# Patient Record
Sex: MALE | Race: WHITE | Employment: OTHER | URBAN - METROPOLITAN AREA
[De-identification: names, ages, dates, MRNs, and addresses within clinical notes are randomized per-mention and may not be internally consistent; named-entity substitution may affect disease eponyms.]

---

## 2017-01-30 PROCEDURE — 80061 LIPID PANEL: CPT | Performed by: INTERNAL MEDICINE

## 2017-01-30 PROCEDURE — 36415 COLL VENOUS BLD VENIPUNCTURE: CPT | Performed by: INTERNAL MEDICINE

## 2017-03-09 PROCEDURE — 36415 COLL VENOUS BLD VENIPUNCTURE: CPT | Performed by: INTERNAL MEDICINE

## 2017-03-09 PROCEDURE — 80048 BASIC METABOLIC PNL TOTAL CA: CPT | Performed by: INTERNAL MEDICINE

## 2017-12-11 PROBLEM — D12.6 COLON ADENOMA: Status: ACTIVE | Noted: 2017-12-11

## 2017-12-11 PROBLEM — J30.2 CHRONIC SEASONAL ALLERGIC RHINITIS: Status: ACTIVE | Noted: 2017-12-11

## 2017-12-11 PROBLEM — M54.30 BACK PAIN WITH SCIATICA: Status: ACTIVE | Noted: 2017-12-11

## 2017-12-11 PROBLEM — M54.9 BACK PAIN WITH SCIATICA: Status: ACTIVE | Noted: 2017-12-11

## 2019-02-20 PROBLEM — I50.32 CHRONIC DIASTOLIC CONGESTIVE HEART FAILURE (HCC): Status: ACTIVE | Noted: 2019-02-20

## 2019-02-20 PROCEDURE — 84153 ASSAY OF PSA TOTAL: CPT | Performed by: NURSE PRACTITIONER

## 2019-03-12 ENCOUNTER — OFFICE VISIT (OUTPATIENT)
Dept: INTERNAL MEDICINE CLINIC | Facility: CLINIC | Age: 69
End: 2019-03-12
Payer: MEDICARE

## 2019-03-12 VITALS
HEART RATE: 63 BPM | BODY MASS INDEX: 40.04 KG/M2 | HEIGHT: 71 IN | WEIGHT: 286 LBS | DIASTOLIC BLOOD PRESSURE: 78 MMHG | SYSTOLIC BLOOD PRESSURE: 120 MMHG | OXYGEN SATURATION: 98 % | TEMPERATURE: 98 F

## 2019-03-12 DIAGNOSIS — I42.9 PRIMARY CARDIOMYOPATHY (HCC): ICD-10-CM

## 2019-03-12 DIAGNOSIS — E78.5 HYPERLIPIDEMIA, UNSPECIFIED HYPERLIPIDEMIA TYPE: ICD-10-CM

## 2019-03-12 DIAGNOSIS — I50.32 CHRONIC DIASTOLIC CONGESTIVE HEART FAILURE (HCC): Primary | ICD-10-CM

## 2019-03-12 DIAGNOSIS — M54.9 BACK PAIN WITH SCIATICA: ICD-10-CM

## 2019-03-12 DIAGNOSIS — Z95.2 H/O AORTIC VALVE REPLACEMENT: ICD-10-CM

## 2019-03-12 DIAGNOSIS — D12.6 COLON ADENOMA: ICD-10-CM

## 2019-03-12 DIAGNOSIS — M54.30 BACK PAIN WITH SCIATICA: ICD-10-CM

## 2019-03-12 PROCEDURE — G0439 PPPS, SUBSEQ VISIT: HCPCS | Performed by: INTERNAL MEDICINE

## 2019-03-12 NOTE — PROGRESS NOTES
Adrián Esparza is a 70year old male who presents for a Medicare Annual Wellness visit. Overall has been feeling well. Compliant with all medications and tolerating all medications.   He does struggle with weight and has problems being strict with good No    Vision Problems? : (P) No    Difficulty walking?: (P) No    Difficulty dressing or bathing?: (P) No    Problems with daily activities? : (P) No    Memory Problems?: (P) No      Fall/Risk Assessment     Do you have 3 or more medical conditions?: (P) 0 every 10 years Colonoscopy due on 11/02/2020 Update [facility] if applicable    Flex Sigmoidoscopy Screen every 5 years No results found for this or any previous visit. No flowsheet data found.      Fecal Occult Blood Annually No results found for: previous visit. No flowsheet data found.       ALLERGIES:     Celecoxib               SWELLING  Enalapril                   Comment:Other reaction(s): ENALAPRIL  Ibuprofen                   Comment:Other reaction(s): IBUPROFEN  Naproxen Sodium             C Other         per ng lung & throat   • Cancer Brother    • Other (copd) Mother       SOCIAL HISTORY:   Social History    Tobacco Use      Smoking status: Former Smoker        Packs/day: 1.00        Years: 9.00        Pack years: 9        Types: Cigarettes Chart Acuity: 20/40 Left Eye Chart Acuity: 20/50   NECK: supple, no adenopathy, no bruits, no thyromegally  CHEST: no chest tenderness  LUNGS: clear to auscultation  CARDIO: RRR without murmur, S1,S2  GI: good BS's, no masses, HSM or tenderness  : , no h plan.  The patient is asked to return in 1 year for physical.    SUGGESTED VACCINATIONS - Influenza, Pneumococcal, Zoster, Tetanus     Immunization History   Administered Date(s) Administered   • FLU VACC High Dose 65 YRS & Older PRSV Free (82358) 12/11/20

## 2019-03-13 ENCOUNTER — PATIENT MESSAGE (OUTPATIENT)
Dept: INTERNAL MEDICINE CLINIC | Facility: CLINIC | Age: 69
End: 2019-03-13

## 2019-03-13 RX ORDER — TADALAFIL 10 MG/1
10 TABLET ORAL
Qty: 6 TABLET | Refills: 3 | Status: SHIPPED | OUTPATIENT
Start: 2019-03-13 | End: 2019-04-01

## 2019-03-13 NOTE — TELEPHONE ENCOUNTER
From: Rachid Dick  To: Azalea Workman MD  Sent: 3/13/2019 8:24 AM CDT  Subject: Non-Urgent Medical Question    WHEN I WAS IN ON TUESDAY I FORGOT TO ASK YOU IF YOU COULD PRESCRIBE A ED MEDICATION. AS I HAVE GOTTEN OLDER THIS HAS BECOME A PROBLEM.  WE D

## 2019-03-14 ENCOUNTER — TELEPHONE (OUTPATIENT)
Dept: INTERNAL MEDICINE CLINIC | Facility: CLINIC | Age: 69
End: 2019-03-14

## 2019-03-14 NOTE — TELEPHONE ENCOUNTER
Diego faxed Prior Auth for Tadalafil 10 mg tablets    Plan does not cover med  Call [phone]  Patient ID# [id number]  - fax in purple folder

## 2019-03-20 NOTE — TELEPHONE ENCOUNTER
[employer] faxed a PA Approval for Tadalafil  Approved from 03/18/90 through 03/18/2020  Placed in Red folder  Routed to Rx

## 2019-03-30 ENCOUNTER — PATIENT MESSAGE (OUTPATIENT)
Dept: INTERNAL MEDICINE CLINIC | Facility: CLINIC | Age: 69
End: 2019-03-30

## 2019-03-31 NOTE — TELEPHONE ENCOUNTER
From: Sherida Habermann Mulsoff  To:  Priscila Geller MD  Sent: 3/30/2019 12:02 PM CDT  Subject: Prescription Question    Since the Tadalafil 10 MG is so expensive through [employer] would it be Nicki Gil for you to confirm the Prescriptions through a [facility]

## 2019-04-01 NOTE — TELEPHONE ENCOUNTER
[facility] prescription request for Tadalafil 10mg   Fax# [phone] NE#[phone]

## 2019-04-01 NOTE — TELEPHONE ENCOUNTER
Please advise - patient wants to use pharmacy in [facility] and mailing to patient?  Med pending

## 2019-04-02 RX ORDER — TADALAFIL 10 MG/1
10 TABLET ORAL
Qty: 6 TABLET | Refills: 3 | OUTPATIENT
Start: 2019-04-02 | End: 2019-04-05

## 2019-04-03 RX ORDER — TADALAFIL 10 MG/1
10 TABLET ORAL
Qty: 6 TABLET | Refills: 3 | Status: CANCELLED | OUTPATIENT
Start: 2019-04-03

## 2019-04-04 ENCOUNTER — PATIENT MESSAGE (OUTPATIENT)
Dept: INTERNAL MEDICINE CLINIC | Facility: CLINIC | Age: 69
End: 2019-04-04

## 2019-04-04 PROBLEM — Z01.818 PREOPERATIVE EVALUATION TO RULE OUT SURGICAL CONTRAINDICATION: Status: ACTIVE | Noted: 2019-04-04

## 2019-04-04 NOTE — TELEPHONE ENCOUNTER
From: Freda Dick  To:  Glen Smith MD  Sent: 4/4/2019 2:10 PM CDT  Subject: Prescription Question    I am trying to get my Tadalafil 10 MG prescription through [facility] in [facility]) could you please send my prescription by Fax to them at [phone]

## 2019-04-05 RX ORDER — TADALAFIL 10 MG/1
10 TABLET ORAL
Qty: 6 TABLET | Refills: 3 | Status: SHIPPED | OUTPATIENT
Start: 2019-04-05 | End: 2019-04-05

## 2019-04-05 RX ORDER — TADALAFIL 10 MG/1
10 TABLET ORAL
Qty: 6 TABLET | Refills: 3 | Status: SHIPPED
Start: 2019-04-05 | End: 2020-08-03

## 2019-04-22 PROBLEM — I50.32 CHRONIC DIASTOLIC CONGESTIVE HEART FAILURE (HCC): Status: RESOLVED | Noted: 2019-02-20 | Resolved: 2019-04-22

## 2019-04-22 PROBLEM — I50.22 CHRONIC SYSTOLIC CONGESTIVE HEART FAILURE (HCC): Status: ACTIVE | Noted: 2019-04-22

## 2019-05-06 ENCOUNTER — MED REC SCAN ONLY (OUTPATIENT)
Dept: INTERNAL MEDICINE CLINIC | Facility: CLINIC | Age: 69
End: 2019-05-06

## 2020-01-13 ENCOUNTER — APPOINTMENT (OUTPATIENT)
Dept: GENERAL RADIOLOGY | Facility: HOSPITAL | Age: 70
End: 2020-01-13
Attending: INTERNAL MEDICINE
Payer: MEDICARE

## 2020-01-13 ENCOUNTER — HOSPITAL ENCOUNTER (OUTPATIENT)
Dept: INTERVENTIONAL RADIOLOGY/VASCULAR | Facility: HOSPITAL | Age: 70
Discharge: HOME | End: 2020-01-14
Attending: INTERNAL MEDICINE | Admitting: INTERNAL MEDICINE
Payer: MEDICARE

## 2020-01-13 DIAGNOSIS — I42.9 CARDIOMYOPATHY (HCC): ICD-10-CM

## 2020-01-13 LAB — MRSA DNA SPEC QL NAA+PROBE: NEGATIVE

## 2020-01-13 PROCEDURE — 93641 EP EVL 1/2CHMB PAC CVDFB TST: CPT

## 2020-01-13 PROCEDURE — 0JH608Z INSERTION OF DEFIBRILLATOR GENERATOR INTO CHEST SUBCUTANEOUS TISSUE AND FASCIA, OPEN APPROACH: ICD-10-PCS | Performed by: INTERNAL MEDICINE

## 2020-01-13 PROCEDURE — 36415 COLL VENOUS BLD VENIPUNCTURE: CPT

## 2020-01-13 PROCEDURE — 87641 MR-STAPH DNA AMP PROBE: CPT | Performed by: INTERNAL MEDICINE

## 2020-01-13 PROCEDURE — 4B02XTZ MEASUREMENT OF CARDIAC DEFIBRILLATOR, EXTERNAL APPROACH: ICD-10-PCS | Performed by: INTERNAL MEDICINE

## 2020-01-13 PROCEDURE — 02H63KZ INSERTION OF DEFIBRILLATOR LEAD INTO RIGHT ATRIUM, PERCUTANEOUS APPROACH: ICD-10-PCS | Performed by: INTERNAL MEDICINE

## 2020-01-13 PROCEDURE — 99153 MOD SED SAME PHYS/QHP EA: CPT

## 2020-01-13 PROCEDURE — 71045 X-RAY EXAM CHEST 1 VIEW: CPT | Performed by: INTERNAL MEDICINE

## 2020-01-13 PROCEDURE — 99152 MOD SED SAME PHYS/QHP 5/>YRS: CPT

## 2020-01-13 PROCEDURE — 02HK3KZ INSERTION OF DEFIBRILLATOR LEAD INTO RIGHT VENTRICLE, PERCUTANEOUS APPROACH: ICD-10-PCS | Performed by: INTERNAL MEDICINE

## 2020-01-13 PROCEDURE — 33249 INSJ/RPLCMT DEFIB W/LEAD(S): CPT

## 2020-01-13 RX ORDER — CEFAZOLIN SODIUM/WATER 2 G/20 ML
SYRINGE (ML) INTRAVENOUS
Status: DISCONTINUED
Start: 2020-01-13 | End: 2020-01-13 | Stop reason: WASHOUT

## 2020-01-13 RX ORDER — ATORVASTATIN CALCIUM 20 MG/1
20 TABLET, FILM COATED ORAL NIGHTLY
Status: DISCONTINUED | OUTPATIENT
Start: 2020-01-13 | End: 2020-01-14

## 2020-01-13 RX ORDER — VANCOMYCIN HYDROCHLORIDE
15 ONCE
Status: DISCONTINUED | OUTPATIENT
Start: 2020-01-13 | End: 2020-01-14

## 2020-01-13 RX ORDER — CARVEDILOL 12.5 MG/1
25 TABLET ORAL 2 TIMES DAILY WITH MEALS
Status: DISCONTINUED | OUTPATIENT
Start: 2020-01-13 | End: 2020-01-14

## 2020-01-13 RX ORDER — ACETAMINOPHEN AND CODEINE PHOSPHATE 300; 30 MG/1; MG/1
TABLET ORAL
Status: COMPLETED
Start: 2020-01-13 | End: 2020-01-13

## 2020-01-13 RX ORDER — ONDANSETRON 2 MG/ML
4 INJECTION INTRAMUSCULAR; INTRAVENOUS EVERY 6 HOURS PRN
Status: DISCONTINUED | OUTPATIENT
Start: 2020-01-13 | End: 2020-01-14

## 2020-01-13 RX ORDER — VANCOMYCIN HYDROCHLORIDE
15 ONCE
Status: COMPLETED | OUTPATIENT
Start: 2020-01-14 | End: 2020-01-14

## 2020-01-13 RX ORDER — SODIUM CHLORIDE 9 MG/ML
INJECTION, SOLUTION INTRAVENOUS
Status: ACTIVE | OUTPATIENT
Start: 2020-01-13 | End: 2020-01-13

## 2020-01-13 RX ORDER — ACETAMINOPHEN AND CODEINE PHOSPHATE 300; 30 MG/1; MG/1
1 TABLET ORAL EVERY 4 HOURS PRN
Status: DISCONTINUED | OUTPATIENT
Start: 2020-01-13 | End: 2020-01-14

## 2020-01-13 RX ORDER — ASPIRIN 81 MG/1
81 TABLET ORAL DAILY
Status: DISCONTINUED | OUTPATIENT
Start: 2020-01-14 | End: 2020-01-14

## 2020-01-13 RX ORDER — MIDAZOLAM HYDROCHLORIDE 1 MG/ML
INJECTION INTRAMUSCULAR; INTRAVENOUS
Status: COMPLETED
Start: 2020-01-13 | End: 2020-01-13

## 2020-01-13 RX ORDER — BACITRACIN 50000 [USP'U]/1
INJECTION, POWDER, LYOPHILIZED, FOR SOLUTION INTRAMUSCULAR
Status: COMPLETED
Start: 2020-01-13 | End: 2020-01-13

## 2020-01-13 RX ORDER — LIDOCAINE HYDROCHLORIDE AND EPINEPHRINE 10; 10 MG/ML; UG/ML
INJECTION, SOLUTION INFILTRATION; PERINEURAL
Status: COMPLETED
Start: 2020-01-13 | End: 2020-01-13

## 2020-01-13 RX ADMIN — ACETAMINOPHEN AND CODEINE PHOSPHATE 1 TABLET: 300; 30 TABLET ORAL at 22:24:00

## 2020-01-13 RX ADMIN — ACETAMINOPHEN AND CODEINE PHOSPHATE 1 TABLET: 300; 30 TABLET ORAL at 16:00:00

## 2020-01-13 RX ADMIN — CARVEDILOL 25 MG: 12.5 TABLET ORAL at 17:31:00

## 2020-01-13 NOTE — PROGRESS NOTES
Procedure hand off report called to [facility].  Vitals signs stable procedure site remains intact without signs of hematoma or bleeding. Dr. Lupis Rutledge spoke with patient / family post procedure.    Transferred to floor   Hand off at bedside Update to

## 2020-01-13 NOTE — PLAN OF CARE
Patient received as an admission from cath lab post biventricular ICD insertion by Dr Carl Mcdonough. Pt doing well. Surgical incision site dressing CDI. Denies any discomfort at this time. VS stable. L arm sling in place. AV paced on telemetry.  Pt and wife oriented of decreased coronary artery perfusion - ex.  Angina  - Evaluate fluid balance, assess for edema, trend weights  Outcome: Progressing  Goal: Absence of cardiac arrhythmias or at baseline  Description  INTERVENTIONS:  - Continuous cardiac monitoring, monitor

## 2020-01-13 NOTE — PROGRESS NOTES
Post procedure. Left chest incision site intact without any hematoma or active bleeding. Pain noted pressure pain at surgical site. Dr. Kelly Patel at bedside order recd. Tylenol with codeine given. Will continue to monitor.    Stat Portable chest xray do

## 2020-01-13 NOTE — INTERVAL H&P NOTE
Pre-op Diagnosis: * No pre-op diagnosis entered *    The above referenced H&P was reviewed by Navdeep Muhammad MD on 1/13/2020, the patient was examined and no significant changes have occurred in the patient's condition since the H&P was performed.   I discus

## 2020-01-14 ENCOUNTER — APPOINTMENT (OUTPATIENT)
Dept: GENERAL RADIOLOGY | Facility: HOSPITAL | Age: 70
End: 2020-01-14
Attending: INTERNAL MEDICINE
Payer: MEDICARE

## 2020-01-14 VITALS
WEIGHT: 296 LBS | HEART RATE: 67 BPM | SYSTOLIC BLOOD PRESSURE: 99 MMHG | HEIGHT: 71 IN | DIASTOLIC BLOOD PRESSURE: 54 MMHG | TEMPERATURE: 98 F | RESPIRATION RATE: 16 BRPM | OXYGEN SATURATION: 92 % | BODY MASS INDEX: 41.44 KG/M2

## 2020-01-14 PROCEDURE — 71046 X-RAY EXAM CHEST 2 VIEWS: CPT | Performed by: INTERNAL MEDICINE

## 2020-01-14 RX ADMIN — VANCOMYCIN HYDROCHLORIDE 1500 MG: at 02:41:00

## 2020-01-14 RX ADMIN — CARVEDILOL 25 MG: 12.5 TABLET ORAL at 09:19:00

## 2020-01-14 RX ADMIN — ACETAMINOPHEN AND CODEINE PHOSPHATE 1 TABLET: 300; 30 TABLET ORAL at 04:33:00

## 2020-01-14 RX ADMIN — ASPIRIN 81 MG: 81 TABLET ORAL at 09:18:00

## 2020-01-14 NOTE — PROCEDURES
[facility]    PATIENT'S NAME: Rhea Rich   ATTENDING PHYSICIAN: Alberto Mart. Boom Choi MD   OPERATING PHYSICIAN: Alberto Mart.  Boom Choi MD   PATIENT ACCOUNT#:   [id number]    LOCATION:  [address]  MEDICAL RECORD #:   [id number]       FRANKLIN seen were intact. One was a [id number], serial [id number]. This was tested and functioning well. It had a separation of 130. The other one would also be tested; [id number], serial #[id number].   This had a pacing threshold of 2.6 V, separation of 120, and pa subclavian vein, freeing up of old epicardial lead, testing both epicardial leads, choosing one. DFTs less than or equal to 30 joules. Dictated By Karely Rowe.  Jethro San MD  d: 01/13/2020 15:41:02  t: 01/13/2020 16:05:03  Job [id number]  [facility]/

## 2020-01-14 NOTE — PLAN OF CARE
A/O x4. RA. S/p biventricular ICD insertion by Dr. Aletha Schlatter. Hx cardiomyopathy, pacemaker (a-v paced). IV abx (vancomycin). Tylenol #3 given for px management at 2224. Plan: CXR PA lateral 1/14. Home w/ wife upon d/c.     Problem: Patient Centered Care  Goal: P weights  Outcome: Progressing  Goal: Absence of cardiac arrhythmias or at baseline  Description  INTERVENTIONS:  - Continuous cardiac monitoring, monitor vital signs, obtain 12 lead EKG if indicated  - Evaluate effectiveness of antiarrhythmic and heart rat

## 2020-01-14 NOTE — PROGRESS NOTES
ASSESSMENT/PLAN:     IMP:  1. Cardiomyopathy without chf  2. S/p avr for ai  3. icd post implant day 1    rec ok to dc  Reviewed meds, activity, follow up with them  ?  answered          ------------------------------------------------------------------- SWELLING  Enalapril                   Comment:Other reaction(s): ENALAPRIL  Ibuprofen                   Comment:Other reaction(s): IBUPROFEN  Naproxen Sodium             Comment:Other reaction(s): NAPROXEN SODIUM  Penicillins                 Comment:

## 2020-01-14 NOTE — PROGRESS NOTES
Outpatient Surgery Brief Discharge Summary         Patient ID:  Real Wilson  [id number]  78year old  6/17/1950    Discharge Diagnoses: cardiomyopathy  Procedures: icd  Discharged Condition: stable    Disposition: home    Patient Instructions:  Follow-up

## 2020-01-22 PROBLEM — Z95.810 AICD (AUTOMATIC CARDIOVERTER/DEFIBRILLATOR) PRESENT: Status: ACTIVE | Noted: 2020-01-22

## 2020-07-08 PROBLEM — I48.3 TYPICAL ATRIAL FLUTTER (HCC): Status: ACTIVE | Noted: 2020-07-08

## 2020-08-04 ENCOUNTER — LAB ENCOUNTER (OUTPATIENT)
Dept: LAB | Facility: HOSPITAL | Age: 70
End: 2020-08-04
Attending: INTERNAL MEDICINE
Payer: MEDICARE

## 2020-08-04 DIAGNOSIS — Z01.818 PREOP TESTING: ICD-10-CM

## 2020-08-04 LAB — SARS-COV-2 RNA RESP QL NAA+PROBE: NOT DETECTED

## 2020-08-05 ENCOUNTER — HOSPITAL ENCOUNTER (OUTPATIENT)
Dept: INTERVENTIONAL RADIOLOGY/VASCULAR | Facility: HOSPITAL | Age: 70
Discharge: HOME | End: 2020-08-05
Attending: INTERNAL MEDICINE | Admitting: INTERNAL MEDICINE
Payer: MEDICARE

## 2020-08-05 VITALS
DIASTOLIC BLOOD PRESSURE: 66 MMHG | OXYGEN SATURATION: 97 % | BODY MASS INDEX: 41 KG/M2 | HEART RATE: 61 BPM | SYSTOLIC BLOOD PRESSURE: 108 MMHG | RESPIRATION RATE: 18 BRPM | WEIGHT: 291 LBS

## 2020-08-05 DIAGNOSIS — I48.92 ATRIAL FLUTTER (HCC): ICD-10-CM

## 2020-08-05 DIAGNOSIS — Z01.818 PREOP TESTING: Primary | ICD-10-CM

## 2020-08-05 LAB — POTASSIUM SERPL-SCNC: 4 MMOL/L (ref 3.5–5.1)

## 2020-08-05 PROCEDURE — 5A2204Z RESTORATION OF CARDIAC RHYTHM, SINGLE: ICD-10-PCS | Performed by: INTERNAL MEDICINE

## 2020-08-05 PROCEDURE — 93010 ELECTROCARDIOGRAM REPORT: CPT | Performed by: INTERNAL MEDICINE

## 2020-08-05 PROCEDURE — 36415 COLL VENOUS BLD VENIPUNCTURE: CPT

## 2020-08-05 PROCEDURE — 92960 CARDIOVERSION ELECTRIC EXT: CPT

## 2020-08-05 PROCEDURE — 93005 ELECTROCARDIOGRAM TRACING: CPT

## 2020-08-05 PROCEDURE — 84132 ASSAY OF SERUM POTASSIUM: CPT | Performed by: INTERNAL MEDICINE

## 2020-08-05 RX ORDER — SODIUM CHLORIDE 9 MG/ML
INJECTION, SOLUTION INTRAVENOUS CONTINUOUS
Status: DISCONTINUED | OUTPATIENT
Start: 2020-08-05 | End: 2020-08-05

## 2020-08-05 NOTE — INTERVAL H&P NOTE
Pre-op Diagnosis: * No pre-op diagnosis entered *    The above referenced H&P was reviewed by Lizz Milton MD on 8/5/2020, the patient was examined and no significant changes have occurred in the patient's condition since the H&P was performed.   I d

## 2020-08-05 NOTE — IVS NOTE
Pt was able to eat and drink, no nausea or vomiting. EKG done as ordered. Discharge instructions was given.      Pt was comfortable on discharge

## 2020-08-05 NOTE — PROCEDURES
[facility]    Cardioversion Procedure Note    Sandra Fee [facility] Patient Status:  Outpatient in a Bed    1950 MRN [id number]   Location [facility] Attending Jyoti Nelson MD   Hosp Day # 0 PCP Delma Lee

## 2020-08-05 NOTE — PROGRESS NOTES
Outpatient Surgery Brief Discharge Summary         Patient ID:  Raciel Ness  [id number]  69year old  6/17/1950    Discharge Diagnoses: atrila fib  Procedures: dc cardioversion    Discharged Condition: stable    Disposition: home    Patient Instructions

## 2020-09-29 ENCOUNTER — LAB ENCOUNTER (OUTPATIENT)
Dept: LAB | Age: 70
End: 2020-09-29
Attending: INTERNAL MEDICINE
Payer: MEDICARE

## 2020-09-29 ENCOUNTER — OFFICE VISIT (OUTPATIENT)
Dept: INTERNAL MEDICINE CLINIC | Facility: CLINIC | Age: 70
End: 2020-09-29
Payer: MEDICARE

## 2020-09-29 VITALS
HEART RATE: 75 BPM | BODY MASS INDEX: 42 KG/M2 | SYSTOLIC BLOOD PRESSURE: 124 MMHG | TEMPERATURE: 97 F | WEIGHT: 298 LBS | OXYGEN SATURATION: 98 % | RESPIRATION RATE: 14 BRPM | DIASTOLIC BLOOD PRESSURE: 80 MMHG

## 2020-09-29 DIAGNOSIS — M54.30 BACK PAIN WITH SCIATICA: ICD-10-CM

## 2020-09-29 DIAGNOSIS — D12.6 COLON ADENOMA: ICD-10-CM

## 2020-09-29 DIAGNOSIS — Z12.5 ENCOUNTER FOR SCREENING FOR MALIGNANT NEOPLASM OF PROSTATE: ICD-10-CM

## 2020-09-29 DIAGNOSIS — M54.9 BACK PAIN WITH SCIATICA: ICD-10-CM

## 2020-09-29 DIAGNOSIS — Z00.00 PHYSICAL EXAM: ICD-10-CM

## 2020-09-29 DIAGNOSIS — J30.2 CHRONIC SEASONAL ALLERGIC RHINITIS: ICD-10-CM

## 2020-09-29 DIAGNOSIS — Z95.2 H/O AORTIC VALVE REPLACEMENT: ICD-10-CM

## 2020-09-29 DIAGNOSIS — I48.3 TYPICAL ATRIAL FLUTTER (HCC): ICD-10-CM

## 2020-09-29 DIAGNOSIS — Z00.00 PHYSICAL EXAM: Primary | ICD-10-CM

## 2020-09-29 DIAGNOSIS — I50.22 CHRONIC SYSTOLIC CONGESTIVE HEART FAILURE (HCC): ICD-10-CM

## 2020-09-29 DIAGNOSIS — E66.01 MORBID OBESITY (HCC): ICD-10-CM

## 2020-09-29 LAB — TSI SER-ACNC: 2.06 MIU/ML (ref 0.36–3.74)

## 2020-09-29 PROCEDURE — 85025 COMPLETE CBC W/AUTO DIFF WBC: CPT

## 2020-09-29 PROCEDURE — 80061 LIPID PANEL: CPT

## 2020-09-29 PROCEDURE — 80053 COMPREHEN METABOLIC PANEL: CPT

## 2020-09-29 PROCEDURE — G0463 HOSPITAL OUTPT CLINIC VISIT: HCPCS | Performed by: INTERNAL MEDICINE

## 2020-09-29 PROCEDURE — 90662 IIV NO PRSV INCREASED AG IM: CPT | Performed by: INTERNAL MEDICINE

## 2020-09-29 PROCEDURE — G0439 PPPS, SUBSEQ VISIT: HCPCS | Performed by: INTERNAL MEDICINE

## 2020-09-29 PROCEDURE — 84443 ASSAY THYROID STIM HORMONE: CPT | Performed by: INTERNAL MEDICINE

## 2020-09-29 PROCEDURE — 36415 COLL VENOUS BLD VENIPUNCTURE: CPT | Performed by: INTERNAL MEDICINE

## 2020-09-29 PROCEDURE — G0008 ADMIN INFLUENZA VIRUS VAC: HCPCS | Performed by: INTERNAL MEDICINE

## 2020-09-29 PROCEDURE — 99213 OFFICE O/P EST LOW 20 MIN: CPT | Performed by: INTERNAL MEDICINE

## 2020-09-29 PROCEDURE — 83036 HEMOGLOBIN GLYCOSYLATED A1C: CPT

## 2020-09-29 NOTE — PROGRESS NOTES
Torrie Jennings is a 69year old male who presents for a Medicare Annual Wellness visit. Overall, patient has been feeling well. He did develop recurrent atrial flutter, saw cardiologist and required cardioversion.   Since then he has been remaining in help    Toileting: Able without help    Dressing: Able without help    Eating: Able without help    Driving: Able without help    Preparing your meals: Able without help    Managing money/bills: Able without help    Taking medications as prescribed: Able w 5.4    No flowsheet data found.        Fasting Blood Sugar (FSB)Annually Glucose (mg/dL)   Date Value   08/03/2020 115 (H)          Cardiovascular Disease Screening     LDL Annually Calculated LDL (mg/dL)   Date Value   12/30/2019 111        EKG One Time No Date Value   02/20/2019 5.4    No flowsheet data found. Creat/alb ratio  Annually      LDL  Annually Calculated LDL (mg/dL)   Date Value   12/30/2019 111    No flowsheet data found. Dilated Eye exam  Annually No flowsheet data found.   No flowsheet • DJD (degenerative joint disease)    • Heart valve disease    • High blood pressure    • High cholesterol    • Obesity, unspecified    • Osteoarthritis    • Other and unspecified hyperlipidemia    • Seasonal allergies    • Unspecified essential hyperten Temp 97.2 °F (36.2 °C) (Temporal)   Resp 14   Wt 298 lb (135.2 kg)   SpO2 98%   BMI 41.56 kg/m²      > Wt Readings from Last 6 Encounters:  09/29/20 : 298 lb (135.2 kg)  08/12/20 : 290 lb (131.5 kg)  08/04/20 : 291 lb (132 kg)  08/03/20 : 291 lb (132 kg) obesity (HCC)    Chronic systolic congestive heart failure (HCC)    Typical atrial flutter (HCC)    Chronic seasonal allergic rhinitis    Back pain with sciatica    H/O aortic valve replacement    Colon adenoma  -     GASTRO - INTERNAL    Encounter for scr control with use of Allegra as needed. Patient has history of colon adenoma and will be due for follow-up colonoscopy in November of this year--I have given him referral to follow-up with gastroenterology. We will check screening PSA today.     Taya

## 2020-10-02 ENCOUNTER — TELEPHONE (OUTPATIENT)
Dept: GASTROENTEROLOGY | Facility: CLINIC | Age: 70
End: 2020-10-02

## 2020-10-02 ENCOUNTER — TELEPHONE (OUTPATIENT)
Dept: INTERNAL MEDICINE CLINIC | Facility: CLINIC | Age: 70
End: 2020-10-02

## 2020-10-02 NOTE — TELEPHONE ENCOUNTER
----- Message from Karoline Beal RN sent at 11/3/2015  8:35 AM CST -----  Regarding: colon recall  Repeat colon in 5 years per Dr Reyes Nunn

## 2020-10-26 ENCOUNTER — TELEPHONE (OUTPATIENT)
Dept: GASTROENTEROLOGY | Facility: CLINIC | Age: 70
End: 2020-10-26

## 2020-10-26 NOTE — TELEPHONE ENCOUNTER
Patient:   Terrell Santana #:   [id number]                    Room: [facility]  Zoë CALERO #:  [id number]     ADMITTED:   11/02/2015        DATE OF PROCEDURE:  11/02/2015     PREPROCEDURE DIAGNOSIS:  Prior history of colon polyps.   Last   colonoscopy  examinat A  ATTENDING:  Anne-Marie Sosa, [address]  DICTATING:    Sonal Johnson. Aliza Sosa MD [id number]  MD ID #:      [id number]  cc:   Orville Duke  E  Apryl Sosa, [address]        JOB NUMBER:         [id number]  [facility] #: [id number].[facility]  MR #: [id number] Felton Flores MD                11/02/15 (Electronic)   --------------------------------------------------------------------------------                                                   Lory Arcos M.D., [facility] LICENSE#: [id number]

## 2020-10-26 NOTE — TELEPHONE ENCOUNTER
Left message for patient to call back to review below screening questions.     Last Procedure, Date, MD:  Dr. Navdeep Hilario Colonoscopy 11/02/2015  Last Diagnosis:  Colon polyp x1, diverticulosis, internal hemorrhoids  Recalled for (mth/yrs): 5 years  Sedation used

## 2020-10-27 NOTE — TELEPHONE ENCOUNTER
Patient has appointment with cardiologist in same building on 11/17 so he accepted appointment to follow with Megan Jennings at 2:30pm.  Patient advised to notify our office if he is running late at prior appointment.     Future Appointments   Date Time Provider Sandra Post

## 2020-10-27 NOTE — TELEPHONE ENCOUNTER
Ivania Ramires    Patient does not want to come to office-I advised office appointment. Had pacemaker placed one year ago. I reviewed screening/all medications below.       Last Procedure, Date, MD:  Dr. Anna Marie Navarro Colonoscopy 11/02/2015  Last Diagnosis:  Colon polyp

## 2020-10-27 NOTE — TELEPHONE ENCOUNTER
Nursing: I would agree w/ OV.  If there is any new cardiac hx, this needs to be assessed in office prior to scheduling

## 2020-11-16 ENCOUNTER — PATIENT MESSAGE (OUTPATIENT)
Dept: GASTROENTEROLOGY | Facility: CLINIC | Age: 70
End: 2020-11-16

## 2020-11-16 NOTE — TELEPHONE ENCOUNTER
From: Meghan Dodson [facility]  To: GRISELDA Trevino  Sent: 11/16/2020 7:20 AM CST  Subject: Non-Urgent Medical Question    Please cancel my appointment for November 17th.  Because of the increase in Covid cases I will not be having the procedure till after I am

## 2020-11-16 NOTE — TELEPHONE ENCOUNTER
AL Ngay    Patient due for 5 year recall colonoscopy. Cancelled screening appointment due to COVID concerns.     Thank you

## 2020-12-15 ENCOUNTER — APPOINTMENT (OUTPATIENT)
Dept: GENERAL RADIOLOGY | Facility: HOSPITAL | Age: 70
DRG: 287 | End: 2020-12-15
Attending: EMERGENCY MEDICINE
Payer: MEDICARE

## 2020-12-15 ENCOUNTER — HOSPITAL ENCOUNTER (INPATIENT)
Facility: HOSPITAL | Age: 70
LOS: 3 days | Discharge: ANOTHER HOSPITAL | DRG: 287 | End: 2020-12-18
Attending: EMERGENCY MEDICINE | Admitting: INTERNAL MEDICINE
Payer: MEDICARE

## 2020-12-15 ENCOUNTER — APPOINTMENT (OUTPATIENT)
Dept: INTERVENTIONAL RADIOLOGY/VASCULAR | Facility: HOSPITAL | Age: 70
DRG: 287 | End: 2020-12-15
Attending: INTERNAL MEDICINE
Payer: MEDICARE

## 2020-12-15 DIAGNOSIS — I47.2 VENTRICULAR TACHYCARDIA (HCC): ICD-10-CM

## 2020-12-15 DIAGNOSIS — R55 SYNCOPE AND COLLAPSE: Primary | ICD-10-CM

## 2020-12-15 DIAGNOSIS — Z45.02 DEFIBRILLATOR DISCHARGE: ICD-10-CM

## 2020-12-15 PROBLEM — I47.20 VENTRICULAR TACHYCARDIA (HCC): Status: ACTIVE | Noted: 2020-12-15

## 2020-12-15 PROBLEM — I47.20 VENTRICULAR TACHYCARDIA: Status: ACTIVE | Noted: 2020-12-15

## 2020-12-15 PROCEDURE — B2111ZZ FLUOROSCOPY OF MULTIPLE CORONARY ARTERIES USING LOW OSMOLAR CONTRAST: ICD-10-PCS | Performed by: INTERNAL MEDICINE

## 2020-12-15 PROCEDURE — 71045 X-RAY EXAM CHEST 1 VIEW: CPT | Performed by: EMERGENCY MEDICINE

## 2020-12-15 RX ORDER — VERAPAMIL HYDROCHLORIDE 2.5 MG/ML
INJECTION, SOLUTION INTRAVENOUS
Status: COMPLETED
Start: 2020-12-15 | End: 2020-12-15

## 2020-12-15 RX ORDER — ONDANSETRON 2 MG/ML
4 INJECTION INTRAMUSCULAR; INTRAVENOUS EVERY 6 HOURS PRN
Status: DISCONTINUED | OUTPATIENT
Start: 2020-12-15 | End: 2020-12-18

## 2020-12-15 RX ORDER — SOTALOL HYDROCHLORIDE 80 MG/1
160 TABLET ORAL EVERY 12 HOURS SCHEDULED
Status: DISCONTINUED | OUTPATIENT
Start: 2020-12-15 | End: 2020-12-18

## 2020-12-15 RX ORDER — LIDOCAINE HYDROCHLORIDE 20 MG/ML
INJECTION, SOLUTION EPIDURAL; INFILTRATION; INTRACAUDAL; PERINEURAL
Status: COMPLETED
Start: 2020-12-15 | End: 2020-12-15

## 2020-12-15 RX ORDER — MIDAZOLAM HYDROCHLORIDE 1 MG/ML
INJECTION INTRAMUSCULAR; INTRAVENOUS
Status: COMPLETED
Start: 2020-12-15 | End: 2020-12-15

## 2020-12-15 RX ORDER — HEPARIN SODIUM 1000 [USP'U]/ML
INJECTION, SOLUTION INTRAVENOUS; SUBCUTANEOUS
Status: COMPLETED
Start: 2020-12-15 | End: 2020-12-15

## 2020-12-15 RX ORDER — NITROGLYCERIN 20 MG/100ML
INJECTION INTRAVENOUS
Status: DISCONTINUED
Start: 2020-12-15 | End: 2020-12-15 | Stop reason: WASHOUT

## 2020-12-15 RX ORDER — ZOLPIDEM TARTRATE 5 MG/1
5 TABLET ORAL NIGHTLY PRN
Status: DISCONTINUED | OUTPATIENT
Start: 2020-12-15 | End: 2020-12-18

## 2020-12-15 RX ORDER — ATORVASTATIN CALCIUM 20 MG/1
20 TABLET, FILM COATED ORAL NIGHTLY
Status: DISCONTINUED | OUTPATIENT
Start: 2020-12-15 | End: 2020-12-18

## 2020-12-15 RX ORDER — CETIRIZINE HYDROCHLORIDE 10 MG/1
10 TABLET ORAL DAILY
Status: DISCONTINUED | OUTPATIENT
Start: 2020-12-15 | End: 2020-12-18

## 2020-12-15 RX ORDER — ACETAMINOPHEN 325 MG/1
650 TABLET ORAL EVERY 6 HOURS PRN
Status: DISCONTINUED | OUTPATIENT
Start: 2020-12-15 | End: 2020-12-18

## 2020-12-15 RX ORDER — ASPIRIN 81 MG/1
81 TABLET ORAL DAILY
Status: DISCONTINUED | OUTPATIENT
Start: 2020-12-15 | End: 2020-12-18

## 2020-12-15 RX ORDER — CARVEDILOL 25 MG/1
25 TABLET ORAL 2 TIMES DAILY WITH MEALS
Status: DISCONTINUED | OUTPATIENT
Start: 2020-12-15 | End: 2020-12-18

## 2020-12-15 RX ORDER — SOTALOL HYDROCHLORIDE 80 MG/1
120 TABLET ORAL 2 TIMES DAILY
Status: DISCONTINUED | OUTPATIENT
Start: 2020-12-15 | End: 2020-12-15

## 2020-12-15 RX ORDER — METOCLOPRAMIDE HYDROCHLORIDE 5 MG/ML
10 INJECTION INTRAMUSCULAR; INTRAVENOUS EVERY 8 HOURS PRN
Status: DISCONTINUED | OUTPATIENT
Start: 2020-12-15 | End: 2020-12-18

## 2020-12-15 NOTE — PROGRESS NOTES
ASSESSMENT/PLAN:     .  Ventricular tachycardia post defibrillator shock and is 76-year-old male with a known cardiomyopathy. 2.  History of aortic regurgitation post aortic valve replacement. 3.  History of a cardiomyopathy.   His last echo shows an E Sacubitril-Valsartan (ENTRESTO) 49-51 MG Oral Tab Take 1 tablet by mouth 2 (two) times daily. 180 tablet 3   • ASPIRIN EC LOW DOSE 81 MG Oral Tab EC Take 81 mg by mouth daily. • Omega-3 Fatty Acids (FISH OIL) 1200 MG Oral Cap Take  by mouth.  take 1 c deficits. HEME/LYMPH:denies easy bruising. ALL:no new allergies. ROS otherwise negative except as in HPI.   EXAM:   /73   Pulse 65   Temp 98 °F (36.7 °C) (Temporal)   Resp 16   Ht 5' 11\" (1.803 m)   Wt 291 lb (132 kg)   SpO2 96%   BMI 40.59 kg/m²   C

## 2020-12-15 NOTE — PROCEDURES
Procedure Report    Indication for procedure: ventricular tachycardia    Procedures performed:  1) Coronary angiography   2) Supervision of moderate sedation from 1534 to 1607, with a total of 1mg versed and 50mcg fentanyl.   Sedation administered by certif

## 2020-12-15 NOTE — ED NOTES
Dr Cal Ahumada at [facility]. Plan to go to cath lab this afternoon.

## 2020-12-15 NOTE — ED PROVIDER NOTES
Patient Seen in: [facility] AND [facility] Emergency Department      History   Patient presents with:  Dizziness    Stated Complaint: defib fired x3    HPI    70-year-old male with past medical history significant for high blood pressure, high cholesterol, CHF, Quit date: 1975        Years since quittin.0      Smokeless tobacco: Never Used    Alcohol use: Yes      Alcohol/week: 0.0 standard drinks      Binge frequency: Less than monthly      Comment: rare    Drug use:  No             Review of Systems Abnormality         Status                     ---------                               -----------         ------                     CBC W/ DIFFERENTIAL[[id number]]                              Final result                 Please view results for the to obtain basic health screening including reassessment of your blood pressure.       Medications Prescribed:  Current Discharge Medication List                          Present on Admission  Date Reviewed: 12/9/2020          ICD-10-CM Noted POA    Syncope

## 2020-12-15 NOTE — ED NOTES
[employer] tech at bedside. States the pt was in V tach just prior to be defibrillated. Making adjustment to defibrillator.

## 2020-12-15 NOTE — ED NOTES
rec'd care of pt from triage in wheelchair, assisted onto er cart.  States his defibrillator has fired 3 times in the last few days, last one was this am. States he was sitting on the toilet and had a syncopal episode and fell to the ground then was shocked

## 2020-12-15 NOTE — ED INITIAL ASSESSMENT (HPI)
defibrillator fired 3 times in the last week, last time was this am. C/o feeling lightheaded x3 days. Recently had coreg dose lowered. No w reports he had a syncopal episode this am while in the bathroom. Gabby Luna off toilet. Denies injuries.

## 2020-12-16 ENCOUNTER — APPOINTMENT (OUTPATIENT)
Dept: CV DIAGNOSTICS | Facility: HOSPITAL | Age: 70
DRG: 287 | End: 2020-12-16
Attending: INTERNAL MEDICINE
Payer: MEDICARE

## 2020-12-16 PROCEDURE — 93306 TTE W/DOPPLER COMPLETE: CPT | Performed by: INTERNAL MEDICINE

## 2020-12-16 PROCEDURE — 99222 1ST HOSP IP/OBS MODERATE 55: CPT | Performed by: INTERNAL MEDICINE

## 2020-12-16 RX ORDER — CHLORHEXIDINE GLUCONATE 4 G/100ML
30 SOLUTION TOPICAL
Status: ACTIVE | OUTPATIENT
Start: 2020-12-16 | End: 2020-12-16

## 2020-12-16 RX ORDER — ASPIRIN 81 MG/1
324 TABLET, CHEWABLE ORAL ONCE
Status: DISCONTINUED | OUTPATIENT
Start: 2020-12-16 | End: 2020-12-18

## 2020-12-16 RX ORDER — SODIUM CHLORIDE 9 MG/ML
INJECTION, SOLUTION INTRAVENOUS CONTINUOUS
Status: DISCONTINUED | OUTPATIENT
Start: 2020-12-16 | End: 2020-12-18

## 2020-12-16 RX ORDER — ALPRAZOLAM 0.25 MG/1
0.25 TABLET ORAL 2 TIMES DAILY PRN
Status: DISCONTINUED | OUTPATIENT
Start: 2020-12-16 | End: 2020-12-18

## 2020-12-16 NOTE — PROGRESS NOTES
[facility]    Assessment and Plan:     1. Ventricular tachycardia, S/P appropriate ICD therapy  2. History of aortic regurgitation post aortic valve replacement. 3.  History of a nonischemic cardiomyopathy.   His last echo shows an EF o 12/15/2020 at 1:28 PM     Finalized by (CST): Mady Fernandes MD on 12/15/2020 at 1:29 PM          Ekg 12-lead    Result Date: 12/15/2020  ECG Report  Interpretation  -------------------------- Electronic ventricular pacemaker Pacemaker ECG, No further anal

## 2020-12-16 NOTE — CONSULTS
[facility]    PATIENT'S NAME: Nohemi Coyle   ATTENDING PHYSICIAN: Rodger Rodriguez MD   CONSULTING PHYSICIAN: Esperanza Echeverria.  Enrrique Cartwright MD   PATIENT ACCOUNT#:   [id number]    LOCATION:  [address] [place] [address] #:   [id number]       DATE OF BIRTH:  [birth year] any other over-the-counter medicines. ALLERGIES:  Enalapril, ibuprofen, Naprosyn. SOCIAL HISTORY:  Patient is  with 3 healthy children. No alcohol. He has a morning cup of coffee. Fairly sedentary lifestyle particularly with COVID.     FAMIL possible side effects, including liver, lung, skin, thyroid, and eye toxicity and need for close followup. 2.   Reviewed with Dr. Devang Weiss over the phone.   We will continue higher dose of sotalol for now as patient reflects on options and workup for primary c

## 2020-12-16 NOTE — IVS NOTE
Procedure hand off report given to [employer]. Pt's vital signs are stable. Procedural access site is dry and intact with no signs and symptoms of bleeding and hematoma.    TR Band in place   Dr. Chadd Mills and Dr. Yee Ferris  spoke with patient/family post pro

## 2020-12-16 NOTE — PROGRESS NOTES
Patient is A&Ox4. He is on room air and V-paced on the monitor. Arrived to unit approximately 18:40. Procedural site is dry, intact, and clean with no swelling or bleeding. Vital signs stable. Wife and daughter left for the night.  Report given to [employer]

## 2020-12-16 NOTE — H&P
[facility]    History and Physical    [facility] Patient Status:  Inpatient    1950 MRN [id number]   Location [facility] 3W/SW Attending Jt Pearson MD   Hosp Day # 1 PCP Aydee Reyes MD     Date:  2020  D • Cancer Brother    • Other (copd) Mother      Social History:  Social History    Tobacco Use      Smoking status: Former Smoker        Packs/day: 1.00        Years: 9.00        Pack years: 9        Types: Cigarettes        Quit date: 11/23/1975        Y chest tightness and shortness of breath. Cardiovascular: Negative for chest pain and palpitations. Gastrointestinal: Negative for nausea and abdominal pain. Genitourinary: Negative for dysuria. Musculoskeletal: Negative for back pain.    Neurologic (cpt=71045)    Result Date: 12/15/2020  CONCLUSION:  1. No acute airspace disease. Mild cardiomegaly and normal pulmonary vascularity.     Dictated by (CST): Anson Zapien MD on 12/15/2020 at 1:28 PM     Finalized by (CST): Anson Zapien MD on 12/15/2020

## 2020-12-16 NOTE — PLAN OF CARE
Alert. No c/o pain. Pt c/o not being able to sleep. Declined sleeping aid. Arm in sling to remind pt to limit use of RUE. Bed locked and in lowest position. Call light in reach. Will continue to monitor.        Problem: Patient Centered Care  Goal: Patient trend weights  Outcome: Progressing  Goal: Absence of cardiac arrhythmias or at baseline  Description: INTERVENTIONS:  - Continuous cardiac monitoring, monitor vital signs, obtain 12 lead EKG if indicated  - Evaluate effectiveness of antiarrhythmic and hea

## 2020-12-17 PROCEDURE — 99232 SBSQ HOSP IP/OBS MODERATE 35: CPT | Performed by: INTERNAL MEDICINE

## 2020-12-17 RX ORDER — SPIRONOLACTONE 25 MG/1
12.5 TABLET ORAL DAILY
Status: DISCONTINUED | OUTPATIENT
Start: 2020-12-17 | End: 2020-12-18

## 2020-12-17 NOTE — PROGRESS NOTES
[facility]    Progress Note    Iker Moeller [facility] Patient Status:  Inpatient    1950 MRN [id number]   Location [facility] 3W/SW Attending Shannan Cortez MD   Hosp Day # 2 PCP Cody Riley MD     Subjective:   Subjective: replacement    Colon adenoma    Chronic systolic congestive heart failure ([address]    AICD (automatic cardioverter/defibrillator) present-[employer]    Defibrillator discharge    Ventricular tachycardia [address] [address]    Continues with episodes of VT.   Medication manag

## 2020-12-17 NOTE — PROGRESS NOTES
[facility]    Progress Note    Neil Yates [facility]  69year old  [id number]  Boyce Morn, MD Claudine Leventhal, MD    ASSESSMENT:    1. Ventricular tachycardia with appropriate ICD discharges.   2.       Dilated cardiomyo °C)      Intake/Output:    Intake/Output Summary (Last 24 hours) at 12/17/2020 1253  Last data filed at 12/17/2020 1130  Gross per 24 hour   Intake 924 ml   Output —   Net 924 ml       Wt Readings from Last 3 Encounters:  12/17/20 : 285 lb 14.4 oz (129.7 k Results   Component Value Date    INR 2.3 04/20/2015    INR 2.6 03/23/2015    INR 3.3 (A) 03/03/2015       Medications:    Current Facility-Administered Medications:   •  spironolactone (ALDACTONE) tab 12.5 mg, 12.5 mg, Oral, Daily  •  0.9% NaCl infusion,

## 2020-12-17 NOTE — PLAN OF CARE
Pt alert. Denies pain. Had an episode of 17 beats of vtach -symptomatic- felt like heart racing. MD notified no new order. Slept through the night.    Problem: Patient Centered Care  Goal: Patient preferences are identified and integrated in the patient's p arrhythmias or at baseline  Description: INTERVENTIONS:  - Continuous cardiac monitoring, monitor vital signs, obtain 12 lead EKG if indicated  - Evaluate effectiveness of antiarrhythmic and heart rate control medications as ordered  - Initiate emergency m

## 2020-12-17 NOTE — PLAN OF CARE
Problem: Patient Centered Care  Goal: Patient preferences are identified and integrated in the patient's plan of care  Description: Interventions:  - What would you like us to know as we care for you?  I had a pacemaker placed 11 months ago  - Provide chelle Evaluate effectiveness of antiarrhythmic and heart rate control medications as ordered  - Initiate emergency measures for life threatening arrhythmias  - Monitor electrolytes and administer replacement therapy as ordered  Outcome: Progressing     Pt A&Ox4,

## 2020-12-17 NOTE — PHYSICAL THERAPY NOTE
PHYSICAL THERAPY EVALUATION - INPATIENT     Room Number: 333/333-A  Evaluation Date: 12/17/2020  Type of Evaluation: Initial   Physician Order: PT Eval and Treat    Presenting Problem: syncope and collapse, defrillator went off 3 times  Reason for Therapy Ventricular tachycardia [facility])      Past Medical History  Past Medical History:   Diagnosis Date   • Aortic regurgitation    • Arrhythmia    • Arthritis     per ng   • Congestive heart disease (HCC)    • Congestive heart failure (HCC)     per ng   • DJD (de adjusting bedclothes, sheets and blankets)?: None   -   Sitting down on and standing up from a chair with arms (e.g., wheelchair, bedside commode, etc.): None   -   Moving from lying on back to sitting on the side of the bed?: None   How much help from ano

## 2020-12-17 NOTE — PROGRESS NOTES
Patient is A&Ox4. He is on room air and V-paced on the monitor. All due medications given. TR band is off. No bleeding or swelling at the site. Patient tolerating cardiac diet well. Ambulating very well in room and in hallway with assistance of a walker.  V

## 2020-12-18 ENCOUNTER — HOSPITAL ENCOUNTER (OUTPATIENT)
Dept: INTERVENTIONAL RADIOLOGY/VASCULAR | Facility: HOSPITAL | Age: 70
Discharge: HOME | End: 2020-12-18
Attending: INTERNAL MEDICINE
Payer: MEDICARE

## 2020-12-18 VITALS
HEART RATE: 75 BPM | WEIGHT: 285.88 LBS | SYSTOLIC BLOOD PRESSURE: 96 MMHG | HEIGHT: 71 IN | BODY MASS INDEX: 40.02 KG/M2 | TEMPERATURE: 99 F | RESPIRATION RATE: 18 BRPM | DIASTOLIC BLOOD PRESSURE: 81 MMHG | OXYGEN SATURATION: 97 %

## 2020-12-18 DIAGNOSIS — Z01.818 PREOP TESTING: Primary | ICD-10-CM

## 2020-12-18 RX ORDER — SPIRONOLACTONE 25 MG/1
12.5 TABLET ORAL DAILY
Qty: 30 TABLET | Refills: 1 | Status: SHIPPED | OUTPATIENT
Start: 2020-12-18 | End: 2021-01-12

## 2020-12-18 NOTE — DISCHARGE SUMMARY
[place] [facility]    Discharge Summary    Greig Boast [facility] Patient Status:  Inpatient    1950 MRN [id number]   Location [facility] 3W/SW Attending No att. providers found   Hosp Day # 3 PCP Sharlene Parker MD     Date of Admission: for further management/EP studies    Discharge Condition: Stable         Discharge Medications:      Discharge Medications      START taking these medications      Instructions Prescription details   spironolactone 25 MG Tabs  Commonly known as: ALDACTONE [id number] - [place], [address], [phone], [phone]  [facility] [address]    Phone: [phone]   · spironolactone 25 MG Tabs         Follow up Visits:  Follow-up wit

## 2020-12-18 NOTE — CM/SW NOTE
Called SANDOR Rush to see if she had heard anything from [facility] re: transport of patient in am. Per Shalini Hernandes they will be taking patient to [facility] via [facility] in the am.    Miah Choi at [facility] transport team @ [phone] and informed her air transport

## 2020-12-18 NOTE — CM/SW NOTE
Hai To from [facility] called and stated financial clearance has been obtained    [facility] will receive a call from [facility] transport team for follow up instructions on transporting the patient to [facility] at [place] on 12/18    [facility] transport team #[phone]

## 2020-12-18 NOTE — PLAN OF CARE
Alert and oriented. Pt ambulating with walker. Pt to be transferred to [facility] via [place] transport. No c/o pain. Bed locked and in lowet position. Call light in reach. Will continue to monitor.        Problem: Patient Centered Care  Goal: Brett trend weights  Outcome: Progressing  Goal: Absence of cardiac arrhythmias or at baseline  Description: INTERVENTIONS:  - Continuous cardiac monitoring, monitor vital signs, obtain 12 lead EKG if indicated  - Evaluate effectiveness of antiarrhythmic and hea

## 2020-12-18 NOTE — PLAN OF CARE
No Guerrero from [facility] called to receive patient's background information. Informed this RN Mariangel Cates will need pts chart and a disc with imagining. [facility] to retrieve disc for transport.      Spoke with Peter Heard from [employer] transport team jaquelin

## 2020-12-18 NOTE — CM/SW NOTE
Cardiology CD printed (CD includes LHC and echo). This RN hand delivered cardiology CD, Radiology CD (which includes PCXR's), PCS form and face sheet to [facility] and informed her to ensure all of the above goes with patient on transport.  SANDOR Rush v/u.

## 2020-12-18 NOTE — CM/SW NOTE
Called Sara at [facility] transport team to obtain RM# patient going to - per [facility] at [facility] patient is going directly to their OR upon arrival. PCS completed to reflect the above.

## 2020-12-18 NOTE — CM/SW NOTE
Received call from [address] [place] [address] regarding the transfer of patient to [facility] for an Ablation tomorrow. According to RN, Dr. Osvaldo Escobar informed RN that patient will be transferred at [place] on Friday 12/18/20 to [facility] directly for procedure.

## 2020-12-18 NOTE — PLAN OF CARE
Last set of vitals obtained and given to transporters & called to [address] at [facility]. [facility] transporters received PCS, Face sheet, discharge paper work, Cardiology/Radiology discs, and complete patient's chart.      Report given to [employer] (Charge RN) in [facility]

## 2021-01-11 RX ORDER — SPIRONOLACTONE 25 MG/1
TABLET ORAL
Qty: 45 TABLET | Refills: 0 | OUTPATIENT
Start: 2021-01-11

## 2021-01-11 NOTE — TELEPHONE ENCOUNTER
Current refill request refused due to refill is either a duplicate request or has active refills at the pharmacy. Check previous templates.     Requested Prescriptions     Refused Prescriptions Disp Refills   • SPIRONOLACTONE 25 MG Oral Tab [Pharmacy Med N

## 2021-02-01 DIAGNOSIS — Z23 NEED FOR VACCINATION: ICD-10-CM

## 2021-02-02 ENCOUNTER — IMMUNIZATION (OUTPATIENT)
Dept: LAB | Facility: HOSPITAL | Age: 71
End: 2021-02-02
Attending: HOSPITALIST
Payer: MEDICARE

## 2021-02-02 DIAGNOSIS — Z23 NEED FOR VACCINATION: Primary | ICD-10-CM

## 2021-02-02 PROCEDURE — 0011A SARSCOV2 VAC 100MCG/0.5ML IM: CPT

## 2021-03-02 ENCOUNTER — IMMUNIZATION (OUTPATIENT)
Dept: LAB | Facility: HOSPITAL | Age: 71
End: 2021-03-02
Attending: EMERGENCY MEDICINE
Payer: MEDICARE

## 2021-03-02 DIAGNOSIS — Z23 NEED FOR VACCINATION: Primary | ICD-10-CM

## 2021-03-02 PROCEDURE — 0012A SARSCOV2 VAC 100MCG/0.5ML IM: CPT

## 2021-03-05 ENCOUNTER — TELEPHONE (OUTPATIENT)
Dept: GASTROENTEROLOGY | Facility: CLINIC | Age: 71
End: 2021-03-05

## 2021-03-05 NOTE — TELEPHONE ENCOUNTER
Patient calling to schedule 4 year recall colonoscopy. Patient informed about the 72 hour call back, please call at:[phone],thanks.

## 2021-03-05 NOTE — TELEPHONE ENCOUNTER
Per TE from 10/26/2020 patient needs to be seen in office before scheduling procedure per [facility] due to new cardiac history/pacemaker. (we had already performed telephone screening at that time).     Patient accepted appointment with Gurdeep Carpio to schedule colonosc

## 2021-04-01 ENCOUNTER — TELEPHONE (OUTPATIENT)
Dept: GASTROENTEROLOGY | Facility: CLINIC | Age: 71
End: 2021-04-01

## 2021-04-01 ENCOUNTER — OFFICE VISIT (OUTPATIENT)
Dept: GASTROENTEROLOGY | Facility: CLINIC | Age: 71
End: 2021-04-01
Payer: MEDICARE

## 2021-04-01 VITALS
TEMPERATURE: 98 F | HEIGHT: 71 IN | BODY MASS INDEX: 38.73 KG/M2 | HEART RATE: 67 BPM | DIASTOLIC BLOOD PRESSURE: 64 MMHG | WEIGHT: 276.63 LBS | SYSTOLIC BLOOD PRESSURE: 99 MMHG

## 2021-04-01 DIAGNOSIS — Z86.010 HISTORY OF COLON POLYPS: Primary | ICD-10-CM

## 2021-04-01 DIAGNOSIS — Z79.01 ANTICOAGULANT LONG-TERM USE: ICD-10-CM

## 2021-04-01 DIAGNOSIS — Z01.818 PREOP EXAMINATION: ICD-10-CM

## 2021-04-01 PROCEDURE — 99203 OFFICE O/P NEW LOW 30 MIN: CPT | Performed by: NURSE PRACTITIONER

## 2021-04-01 RX ORDER — SODIUM, POTASSIUM,MAG SULFATES 17.5-3.13G
SOLUTION, RECONSTITUTED, ORAL ORAL
Qty: 1 BOTTLE | Refills: 0 | Status: SHIPPED | OUTPATIENT
Start: 2021-04-01 | End: 2021-07-12

## 2021-04-01 NOTE — PATIENT INSTRUCTIONS
-Schedule colonoscopy w/ Dr. Altagracia Rowell with [facility] related to medical history  Diagnosis: History of colon polyps  -Eligible for NE: No r/t medical history  -Prep: Split dose Suprep for Colyte/TriLyte or equivalent  -Anti-platelets and anti-coagulants: ASA-contin

## 2021-06-22 ENCOUNTER — TELEPHONE (OUTPATIENT)
Dept: GASTROENTEROLOGY | Facility: CLINIC | Age: 71
End: 2021-06-22

## 2021-06-22 DIAGNOSIS — Z86.010 PERSONAL HISTORY OF COLONIC POLYPS: Primary | ICD-10-CM

## 2021-06-22 NOTE — TELEPHONE ENCOUNTER
Dr. Kasia Beth--    I am rescheduling your patients when your out of town in July. Please advise if I can schedule this patient on 8/9/21 at 1230 this is your on call day.  Thank you

## 2021-06-23 NOTE — TELEPHONE ENCOUNTER
Rescheduled for:  Colonoscopy 35527  Provider Name:  Dr. Jewell Gregorio  Date:    From-7/23/21 To-8/9/21  Location:    [facility]  Sedation:  MAC  Time:    From-0730 To-1230 (pt is aware to arrive at 1130)   Prep:  Suprep, sent new instructions via [employer] on 6/24/21  Med

## 2021-07-12 ENCOUNTER — TELEPHONE (OUTPATIENT)
Dept: GASTROENTEROLOGY | Facility: CLINIC | Age: 71
End: 2021-07-12

## 2021-07-12 DIAGNOSIS — Z86.010 PERSONAL HISTORY OF COLONIC POLYPS: Primary | ICD-10-CM

## 2021-07-12 NOTE — TELEPHONE ENCOUNTER
Patient called in to cancel the procedure.  He does not want to reschedule at this time Please cancel

## 2021-07-12 NOTE — TELEPHONE ENCOUNTER
Dr. Yanin Pretty. .. This patient cancelled with no reason given.      You may close this TE when done :)

## 2021-10-19 ENCOUNTER — LAB ENCOUNTER (OUTPATIENT)
Dept: LAB | Age: 71
End: 2021-10-19
Attending: INTERNAL MEDICINE
Payer: MEDICARE

## 2021-10-19 ENCOUNTER — OFFICE VISIT (OUTPATIENT)
Dept: INTERNAL MEDICINE CLINIC | Facility: CLINIC | Age: 71
End: 2021-10-19
Payer: MEDICARE

## 2021-10-19 VITALS
BODY MASS INDEX: 37.83 KG/M2 | WEIGHT: 270.19 LBS | HEIGHT: 71 IN | DIASTOLIC BLOOD PRESSURE: 74 MMHG | TEMPERATURE: 98 F | SYSTOLIC BLOOD PRESSURE: 104 MMHG | HEART RATE: 83 BPM | OXYGEN SATURATION: 99 %

## 2021-10-19 DIAGNOSIS — D12.6 COLON ADENOMA: ICD-10-CM

## 2021-10-19 DIAGNOSIS — I35.9 AORTIC VALVE DISORDER: ICD-10-CM

## 2021-10-19 DIAGNOSIS — Z12.5 ENCOUNTER FOR SCREENING FOR MALIGNANT NEOPLASM OF PROSTATE: ICD-10-CM

## 2021-10-19 DIAGNOSIS — J30.2 CHRONIC SEASONAL ALLERGIC RHINITIS: ICD-10-CM

## 2021-10-19 DIAGNOSIS — Z95.2 H/O AORTIC VALVE REPLACEMENT: ICD-10-CM

## 2021-10-19 DIAGNOSIS — Z95.810 AICD (AUTOMATIC CARDIOVERTER/DEFIBRILLATOR) PRESENT: ICD-10-CM

## 2021-10-19 DIAGNOSIS — M54.30 BACK PAIN WITH SCIATICA: ICD-10-CM

## 2021-10-19 DIAGNOSIS — Z00.00 PHYSICAL EXAM: ICD-10-CM

## 2021-10-19 DIAGNOSIS — Z00.00 PHYSICAL EXAM: Primary | ICD-10-CM

## 2021-10-19 DIAGNOSIS — I42.9 PRIMARY CARDIOMYOPATHY (HCC): ICD-10-CM

## 2021-10-19 DIAGNOSIS — E66.01 MORBID OBESITY (HCC): ICD-10-CM

## 2021-10-19 DIAGNOSIS — I47.2 VENTRICULAR TACHYCARDIA (HCC): ICD-10-CM

## 2021-10-19 DIAGNOSIS — I50.22 CHRONIC SYSTOLIC CONGESTIVE HEART FAILURE (HCC): ICD-10-CM

## 2021-10-19 DIAGNOSIS — M54.9 BACK PAIN WITH SCIATICA: ICD-10-CM

## 2021-10-19 PROCEDURE — G0439 PPPS, SUBSEQ VISIT: HCPCS | Performed by: INTERNAL MEDICINE

## 2021-10-19 PROCEDURE — 84439 ASSAY OF FREE THYROXINE: CPT

## 2021-10-19 PROCEDURE — 83036 HEMOGLOBIN GLYCOSYLATED A1C: CPT

## 2021-10-19 PROCEDURE — G0008 ADMIN INFLUENZA VIRUS VAC: HCPCS | Performed by: INTERNAL MEDICINE

## 2021-10-19 PROCEDURE — 90662 IIV NO PRSV INCREASED AG IM: CPT | Performed by: INTERNAL MEDICINE

## 2021-10-19 PROCEDURE — 85025 COMPLETE CBC W/AUTO DIFF WBC: CPT

## 2021-10-19 PROCEDURE — 36415 COLL VENOUS BLD VENIPUNCTURE: CPT

## 2021-10-19 PROCEDURE — 80061 LIPID PANEL: CPT

## 2021-10-19 PROCEDURE — 84443 ASSAY THYROID STIM HORMONE: CPT

## 2021-10-19 PROCEDURE — 80053 COMPREHEN METABOLIC PANEL: CPT

## 2021-10-19 PROCEDURE — 99213 OFFICE O/P EST LOW 20 MIN: CPT | Performed by: INTERNAL MEDICINE

## 2021-10-19 NOTE — PROGRESS NOTES
Dustin Patel is a 66year old male who presents for a Medicare Annual Wellness visit.     Generally has been feeling well    Since patient last seen by me in December, patient has undergone cardiac ablation procedure at [facility] for recurrent you maintain positive mental well-being?: Social Interaction;Games; Visiting Friends    If you are a male age 38-65 or a female age 47-67, do you take aspirin?: Yes    Have you had any immunizations at another office such as Influenza, Hepatitis B, Tetanus, No flowsheet data found.        Fasting Blood Sugar (FSB)Annually Glucose (mg/dL)   Date Value   06/03/2021 110 (H)          Cardiovascular Disease Screening     LDL Annually LDL Cholesterol (mg/dL)   Date Value   09/29/2020 100 (H)     Calculated LDL (mg/d Annually HbA1c (%)   Date Value   02/20/2019 5.4     HgbA1C (%)   Date Value   09/29/2020 6.1 (H)    No flowsheet data found.     Creat/alb ratio  Annually      LDL  Annually LDL Cholesterol (mg/dL)   Date Value   09/29/2020 100 (H)     Calculated LDL (mg/d Take 180 mg by mouth daily. • Coenzyme Q10 (CO Q-10) 300 MG Oral Cap Take 300 mg by mouth 2 (two) times daily. Co Q-10 200 mg capsule      • Multiple Vitamins-Minerals (MULTI COMPLETE) Oral Cap Take  by mouth.  Take one tablet by mouth daily        ME exertion, denies palpitations.   GI: denies abdominal pain, denies heartburn, nl bms without blood  : 2-3 x per night nocturia, no complaint of urinary incontinence  MUSCULOSKELETAL: denies back pain  NEURO: denies headaches  PSYCHE: denies depression or who presents for a Medicare Assessment. PLAN SUMMARY:   Diagnoses and all orders for this visit:    Physical exam  -     COMP METABOLIC PANEL (14); Future  -     CBC WITH DIFFERENTIAL WITH PLATELET; Future  -     LIPID PANEL;  Future  -     TSH+FREE T4; medication. We will check routine labs including screening PSA. Patient has received COVID-19 vaccine series. Patient received seasonal flu vaccine today. We will arrange follow-up pending lab test results review.       The patient indicates und

## 2021-10-20 ENCOUNTER — TELEPHONE (OUTPATIENT)
Dept: GASTROENTEROLOGY | Facility: CLINIC | Age: 71
End: 2021-10-20

## 2021-10-20 DIAGNOSIS — Z86.010 PERSONAL HISTORY OF COLONIC POLYPS: Primary | ICD-10-CM

## 2021-10-20 NOTE — TELEPHONE ENCOUNTER
Pt states he would like to schedule the procedure. It was previously cancelled in August 2021. I do not see a letter.  Please follow up

## 2021-10-20 NOTE — TELEPHONE ENCOUNTER
Schedulers:  Please contact patient to reschedule colonoscopy that was canceled in August.    Thank you    Orders located in 4/1/2021 Office Visit with Maggie Castañeda orders in 4/1/2021 telephone encounter.

## 2021-11-10 NOTE — TELEPHONE ENCOUNTER
Scheduled for:  Colonoscopy 92450  Provider Name:  Dr. Tamika Pretty  Date:     3/11/22  Location:    [facility]  Sedation:  MAC  Time:    1000 (pt is aware to arrive at 0900)   Prep:  Suprep, sent new instructions via [employer] on 11/11/21  Meds/Allergies Reconciled?:  Ph

## 2022-03-08 ENCOUNTER — LAB ENCOUNTER (OUTPATIENT)
Dept: LAB | Facility: HOSPITAL | Age: 72
End: 2022-03-08
Attending: INTERNAL MEDICINE
Payer: MEDICARE

## 2022-03-08 DIAGNOSIS — Z01.818 PRE-OP TESTING: ICD-10-CM

## 2022-03-08 LAB — SARS-COV-2 RNA RESP QL NAA+PROBE: NOT DETECTED

## 2022-03-09 ENCOUNTER — PATIENT MESSAGE (OUTPATIENT)
Dept: INTERNAL MEDICINE CLINIC | Facility: CLINIC | Age: 72
End: 2022-03-09

## 2022-03-09 NOTE — TELEPHONE ENCOUNTER
From: Que Dick  To: Mee Veliz MD  Sent: 3/9/2022 12:57 PM CST  Subject: Hearing test    Could you send a referral to get a hearing test with Eilene Schlatter.  I need this for Medicare to approve    Gerhard Porter  [phone]

## 2022-03-11 ENCOUNTER — ANESTHESIA (OUTPATIENT)
Dept: ENDOSCOPY | Facility: HOSPITAL | Age: 72
End: 2022-03-11
Payer: MEDICARE

## 2022-03-11 ENCOUNTER — ANESTHESIA EVENT (OUTPATIENT)
Dept: ENDOSCOPY | Facility: HOSPITAL | Age: 72
End: 2022-03-11
Payer: MEDICARE

## 2022-03-11 ENCOUNTER — HOSPITAL ENCOUNTER (OUTPATIENT)
Facility: HOSPITAL | Age: 72
Setting detail: HOSPITAL OUTPATIENT SURGERY
Discharge: HOME | End: 2022-03-11
Attending: INTERNAL MEDICINE | Admitting: INTERNAL MEDICINE
Payer: MEDICARE

## 2022-03-11 VITALS
WEIGHT: 267 LBS | TEMPERATURE: 97 F | RESPIRATION RATE: 18 BRPM | DIASTOLIC BLOOD PRESSURE: 71 MMHG | HEIGHT: 71 IN | BODY MASS INDEX: 37.38 KG/M2 | OXYGEN SATURATION: 96 % | HEART RATE: 60 BPM | SYSTOLIC BLOOD PRESSURE: 106 MMHG

## 2022-03-11 DIAGNOSIS — Z01.818 PRE-OP TESTING: Primary | ICD-10-CM

## 2022-03-11 DIAGNOSIS — Z86.010 PERSONAL HISTORY OF COLONIC POLYPS: ICD-10-CM

## 2022-03-11 DIAGNOSIS — K57.90 DIVERTICULOSIS: ICD-10-CM

## 2022-03-11 DIAGNOSIS — K63.5 POLYP OF ASCENDING COLON: ICD-10-CM

## 2022-03-11 DIAGNOSIS — K62.1 RECTAL POLYP: ICD-10-CM

## 2022-03-11 DIAGNOSIS — K64.9 HEMORRHOIDS: ICD-10-CM

## 2022-03-11 PROCEDURE — 0DBK8ZX EXCISION OF ASCENDING COLON, VIA NATURAL OR ARTIFICIAL OPENING ENDOSCOPIC, DIAGNOSTIC: ICD-10-PCS | Performed by: INTERNAL MEDICINE

## 2022-03-11 PROCEDURE — 45380 COLONOSCOPY AND BIOPSY: CPT | Performed by: INTERNAL MEDICINE

## 2022-03-11 PROCEDURE — 45385 COLONOSCOPY W/LESION REMOVAL: CPT | Performed by: INTERNAL MEDICINE

## 2022-03-11 PROCEDURE — 0DBP8ZX EXCISION OF RECTUM, VIA NATURAL OR ARTIFICIAL OPENING ENDOSCOPIC, DIAGNOSTIC: ICD-10-PCS | Performed by: INTERNAL MEDICINE

## 2022-03-11 RX ORDER — LIDOCAINE HYDROCHLORIDE 10 MG/ML
INJECTION, SOLUTION EPIDURAL; INFILTRATION; INTRACAUDAL; PERINEURAL AS NEEDED
Status: DISCONTINUED | OUTPATIENT
Start: 2022-03-11 | End: 2022-03-11 | Stop reason: SURG

## 2022-03-11 RX ORDER — SODIUM CHLORIDE, SODIUM LACTATE, POTASSIUM CHLORIDE, CALCIUM CHLORIDE 600; 310; 30; 20 MG/100ML; MG/100ML; MG/100ML; MG/100ML
INJECTION, SOLUTION INTRAVENOUS CONTINUOUS
Status: DISCONTINUED | OUTPATIENT
Start: 2022-03-11 | End: 2022-03-11

## 2022-03-11 RX ADMIN — LIDOCAINE HYDROCHLORIDE 50 MG: 10 INJECTION, SOLUTION EPIDURAL; INFILTRATION; INTRACAUDAL; PERINEURAL at 10:01:00

## 2022-03-11 RX ADMIN — SODIUM CHLORIDE, SODIUM LACTATE, POTASSIUM CHLORIDE, CALCIUM CHLORIDE: 600; 310; 30; 20 INJECTION, SOLUTION INTRAVENOUS at 10:00:00

## 2022-03-11 RX ADMIN — SODIUM CHLORIDE, SODIUM LACTATE, POTASSIUM CHLORIDE, CALCIUM CHLORIDE: 600; 310; 30; 20 INJECTION, SOLUTION INTRAVENOUS at 10:23:00

## 2022-03-11 NOTE — OPERATIVE REPORT
[facility] Endoscopy Report      Preoperative Diagnosis:  - history of colon polyps      Postoperative Diagnosis:  - colon polyp x2  - diverticulosis  - internal hemorrhoids      Procedure:    Colonoscopy      Surgeon:  Antonieta Prather M.D. Anesthesia:  MAC sedation    Technique:  After informed consent, the patient was placed in the left lateral recumbent position. Digital rectal examination revealed no palpable intraluminal abnormalities. An Olympus variable stiffness 190 series HD colonoscope was inserted into the rectum and advanced under direct vision by following the lumen to the cecum. The colon was examined upon withdrawal in the left lateral position. The procedure was well tolerated without immediate complication. Findings:  The preparation of the colon was good. The terminal ileum was examined for 4 cm and visually normal.  The ileocecal valve was well preserved. The visualized colonic mucosa from the cecum to the anal verge was normal with an intact vascular pattern. Polyps x2 removed as follows;  -Ascending x1, sessile 3 to 4 mm in size removed by cold snare technique. -Rectum x1, diminutive removed by cold forceps technique. Both polypectomy sites inspected and found to be free of bleeding and specimens retrieved and sent for analysis. Diverticulosis located in the sigmoid colon, no evidence of diverticulitis. Internal hemorrhoids noted on retroflexed view. Estimated blood loss-insignificant  Specimens-colon polyps    Impression:  - colon polyp x2  - diverticulosis  - internal hemorrhoids      Recommendations:  - Post polypectomy instructions given  - Repeat colonoscopy in 7 years  - High fiber diet for diverticular disease  - Symptomatic treatment of hemorrhoids          Ovidio Oakland.  Darell Davis MD  3/11/2022  10:26 AM

## 2022-03-15 ENCOUNTER — TELEPHONE (OUTPATIENT)
Dept: GASTROENTEROLOGY | Facility: CLINIC | Age: 72
End: 2022-03-15

## 2022-03-15 NOTE — TELEPHONE ENCOUNTER
Patient contacted,  verified and results from Dr. Rey Gilman given. Patient voiced understanding. Health maintenance updated. 7 year colonoscopy recall placed in patient outreach. Next due on 2029 per DR. Umanzor.

## 2022-04-03 ENCOUNTER — HOSPITAL ENCOUNTER (OUTPATIENT)
Facility: HOSPITAL | Age: 72
Setting detail: OBSERVATION
Discharge: HOME | End: 2022-04-04
Attending: EMERGENCY MEDICINE | Admitting: INTERNAL MEDICINE
Payer: MEDICARE

## 2022-04-03 ENCOUNTER — APPOINTMENT (OUTPATIENT)
Dept: GENERAL RADIOLOGY | Facility: HOSPITAL | Age: 72
End: 2022-04-03
Attending: EMERGENCY MEDICINE
Payer: MEDICARE

## 2022-04-03 DIAGNOSIS — I49.01 VENTRICULAR FIBRILLATION (HCC): Primary | ICD-10-CM

## 2022-04-03 PROBLEM — I42.0 DILATED CARDIOMYOPATHY (HCC): Status: ACTIVE | Noted: 2022-04-03

## 2022-04-03 LAB
ANION GAP SERPL CALC-SCNC: 3 MMOL/L (ref 0–18)
BASOPHILS # BLD AUTO: 0.03 X10(3) UL (ref 0–0.2)
BASOPHILS NFR BLD AUTO: 0.6 %
BUN BLD-MCNC: 18 MG/DL (ref 7–18)
BUN/CREAT SERPL: 12.2 (ref 10–20)
CALCIUM BLD-MCNC: 9.5 MG/DL (ref 8.5–10.1)
CHLORIDE SERPL-SCNC: 104 MMOL/L (ref 98–112)
CO2 SERPL-SCNC: 30 MMOL/L (ref 21–32)
CREAT BLD-MCNC: 1.47 MG/DL
DEPRECATED RDW RBC AUTO: 45 FL (ref 35.1–46.3)
EOSINOPHIL # BLD AUTO: 0.09 X10(3) UL (ref 0–0.7)
EOSINOPHIL NFR BLD AUTO: 1.8 %
ERYTHROCYTE [DISTWIDTH] IN BLOOD BY AUTOMATED COUNT: 13.2 % (ref 11–15)
GLUCOSE BLD-MCNC: 113 MG/DL (ref 70–99)
HCT VFR BLD AUTO: 46.8 %
HGB BLD-MCNC: 15.3 G/DL
IMM GRANULOCYTES # BLD AUTO: 0.02 X10(3) UL (ref 0–1)
IMM GRANULOCYTES NFR BLD: 0.4 %
LYMPHOCYTES # BLD AUTO: 0.95 X10(3) UL (ref 1–4)
LYMPHOCYTES NFR BLD AUTO: 18.8 %
MCH RBC QN AUTO: 30.3 PG (ref 26–34)
MCHC RBC AUTO-ENTMCNC: 32.7 G/DL (ref 31–37)
MCV RBC AUTO: 92.7 FL
MONOCYTES # BLD AUTO: 0.5 X10(3) UL (ref 0.1–1)
MONOCYTES NFR BLD AUTO: 9.9 %
NEUTROPHILS # BLD AUTO: 3.46 X10 (3) UL (ref 1.5–7.7)
NEUTROPHILS # BLD AUTO: 3.46 X10(3) UL (ref 1.5–7.7)
NEUTROPHILS NFR BLD AUTO: 68.5 %
OSMOLALITY SERPL CALC.SUM OF ELEC: 287 MOSM/KG (ref 275–295)
PLATELET # BLD AUTO: 226 10(3)UL (ref 150–450)
POTASSIUM SERPL-SCNC: 4.3 MMOL/L (ref 3.5–5.1)
RBC # BLD AUTO: 5.05 X10(6)UL
SARS-COV-2 RNA RESP QL NAA+PROBE: NOT DETECTED
SODIUM SERPL-SCNC: 137 MMOL/L (ref 136–145)
WBC # BLD AUTO: 5.1 X10(3) UL (ref 4–11)

## 2022-04-03 PROCEDURE — 71045 X-RAY EXAM CHEST 1 VIEW: CPT | Performed by: EMERGENCY MEDICINE

## 2022-04-03 PROCEDURE — 99285 EMERGENCY DEPT VISIT HI MDM: CPT

## 2022-04-03 PROCEDURE — 85025 COMPLETE CBC W/AUTO DIFF WBC: CPT | Performed by: EMERGENCY MEDICINE

## 2022-04-03 PROCEDURE — 80048 BASIC METABOLIC PNL TOTAL CA: CPT | Performed by: EMERGENCY MEDICINE

## 2022-04-03 PROCEDURE — 36415 COLL VENOUS BLD VENIPUNCTURE: CPT

## 2022-04-03 PROCEDURE — 93010 ELECTROCARDIOGRAM REPORT: CPT | Performed by: EMERGENCY MEDICINE

## 2022-04-03 PROCEDURE — 93005 ELECTROCARDIOGRAM TRACING: CPT

## 2022-04-03 RX ORDER — AMIODARONE HYDROCHLORIDE 50 MG/ML
150 INJECTION, SOLUTION INTRAVENOUS ONCE
Status: DISCONTINUED | OUTPATIENT
Start: 2022-04-03 | End: 2022-04-03

## 2022-04-03 RX ORDER — METOPROLOL SUCCINATE 50 MG/1
50 TABLET, EXTENDED RELEASE ORAL NIGHTLY
COMMUNITY
End: 2022-04-03 | Stop reason: CLARIF

## 2022-04-03 RX ORDER — METOPROLOL TARTRATE 50 MG/1
50 TABLET, FILM COATED ORAL NIGHTLY
COMMUNITY

## 2022-04-03 RX ORDER — ASPIRIN 81 MG/1
81 TABLET ORAL DAILY
Status: DISCONTINUED | OUTPATIENT
Start: 2022-04-03 | End: 2022-04-04

## 2022-04-03 RX ORDER — AMIODARONE HYDROCHLORIDE 200 MG/1
200 TABLET ORAL 2 TIMES DAILY
Status: DISCONTINUED | OUTPATIENT
Start: 2022-04-03 | End: 2022-04-04

## 2022-04-03 RX ORDER — CETIRIZINE HYDROCHLORIDE 10 MG/1
10 TABLET ORAL DAILY
Status: DISCONTINUED | OUTPATIENT
Start: 2022-04-04 | End: 2022-04-04

## 2022-04-03 RX ORDER — METOPROLOL TARTRATE 50 MG/1
50 TABLET, FILM COATED ORAL NIGHTLY
Status: DISCONTINUED | OUTPATIENT
Start: 2022-04-03 | End: 2022-04-04

## 2022-04-03 RX ORDER — SPIRONOLACT/HYDROCHLOROTHIAZID 25 MG-25MG
300 TABLET ORAL NIGHTLY
Status: DISCONTINUED | OUTPATIENT
Start: 2022-04-03 | End: 2022-04-03

## 2022-04-03 RX ORDER — MEXILETINE HYDROCHLORIDE 150 MG/1
150 CAPSULE ORAL EVERY 12 HOURS SCHEDULED
Status: DISCONTINUED | OUTPATIENT
Start: 2022-04-03 | End: 2022-04-04

## 2022-04-03 RX ORDER — ATORVASTATIN CALCIUM 10 MG/1
10 TABLET, FILM COATED ORAL NIGHTLY
Status: DISCONTINUED | OUTPATIENT
Start: 2022-04-03 | End: 2022-04-04

## 2022-04-03 RX ORDER — SPIRONOLACTONE 25 MG/1
25 TABLET ORAL DAILY
Status: DISCONTINUED | OUTPATIENT
Start: 2022-04-04 | End: 2022-04-04

## 2022-04-03 RX ORDER — FUROSEMIDE 20 MG/1
20 TABLET ORAL EVERY OTHER DAY
Status: DISCONTINUED | OUTPATIENT
Start: 2022-04-05 | End: 2022-04-04

## 2022-04-03 RX ORDER — MEXILETINE HYDROCHLORIDE 150 MG/1
150 CAPSULE ORAL EVERY 8 HOURS SCHEDULED
Status: DISCONTINUED | OUTPATIENT
Start: 2022-04-03 | End: 2022-04-03

## 2022-04-03 NOTE — ED QUICK NOTES
Orders for admission, patient is aware of plan and ready to go upstairs. Any questions, please call ED RN juan francisco at extension [phone].      Patient Covid vaccination status: Fully vaccinated     COVID Test Ordered in ED: Rapid SARS-CoV-2 by PCR-negative     COVID Suspicion at Admission: N/A    Running Infusions:  None    Mental Status/LOC at time of transport: a/ox4    Other pertinent information:   CIWA score: N/A   NIH score:  N/A

## 2022-04-03 NOTE — PROGRESS NOTES
Patient presented in bedside chair with lunch on his tray. Patient experienced VFib, momentarily lost consciousness at home, arrived via ED. Patient had been planning to be in [place] this weekend to watch grandchildren with his spouse. Patient feeling better now but will undergo tests before he will be DC. Provided companionship, encouragement, prayer (Yazidi Non-Denom), active listening, non-anxious presence, and support for spiritual, emotional, and logistical needs. 04/03/22 1417   Clinical Encounter Type   Visited With Patient   Routine Visit Introduction   Referral From Nurse   Referral To    Zoroastrian Encounters   Zoroastrian Needs Prayer   Taxonomy   Intended Effects Build relationship of care and support   Methods Encouraging spiritual/Mu-ism practices; Explore quality of life; Offer emotional support; Offer spiritual/Mu-ism support; Offer support;Exploring hope   Interventions Active listening; Ask guided questions; Acknowledge current situation;[place];Prayer for healing; Facilitate understanding of limitations; Identify supportive relationship(s)

## 2022-04-04 ENCOUNTER — APPOINTMENT (OUTPATIENT)
Dept: CV DIAGNOSTICS | Facility: HOSPITAL | Age: 72
End: 2022-04-04
Attending: INTERNAL MEDICINE
Payer: MEDICARE

## 2022-04-04 VITALS
WEIGHT: 267.13 LBS | HEIGHT: 71 IN | SYSTOLIC BLOOD PRESSURE: 110 MMHG | HEART RATE: 65 BPM | BODY MASS INDEX: 37.4 KG/M2 | RESPIRATION RATE: 16 BRPM | TEMPERATURE: 99 F | DIASTOLIC BLOOD PRESSURE: 79 MMHG | OXYGEN SATURATION: 96 %

## 2022-04-04 LAB
ANION GAP SERPL CALC-SCNC: 6 MMOL/L (ref 0–18)
BASOPHILS # BLD AUTO: 0.04 X10(3) UL (ref 0–0.2)
BASOPHILS NFR BLD AUTO: 0.7 %
BUN BLD-MCNC: 18 MG/DL (ref 7–18)
BUN/CREAT SERPL: 15.7 (ref 10–20)
CALCIUM BLD-MCNC: 9.3 MG/DL (ref 8.5–10.1)
CHLORIDE SERPL-SCNC: 108 MMOL/L (ref 98–112)
CO2 SERPL-SCNC: 25 MMOL/L (ref 21–32)
CREAT BLD-MCNC: 1.15 MG/DL
DEPRECATED RDW RBC AUTO: 45 FL (ref 35.1–46.3)
EOSINOPHIL # BLD AUTO: 0.13 X10(3) UL (ref 0–0.7)
EOSINOPHIL NFR BLD AUTO: 2.4 %
ERYTHROCYTE [DISTWIDTH] IN BLOOD BY AUTOMATED COUNT: 13.2 % (ref 11–15)
GLUCOSE BLD-MCNC: 104 MG/DL (ref 70–99)
HCT VFR BLD AUTO: 43.8 %
HGB BLD-MCNC: 14.4 G/DL
IMM GRANULOCYTES # BLD AUTO: 0.02 X10(3) UL (ref 0–1)
IMM GRANULOCYTES NFR BLD: 0.4 %
LYMPHOCYTES # BLD AUTO: 1.18 X10(3) UL (ref 1–4)
LYMPHOCYTES NFR BLD AUTO: 21.9 %
MCH RBC QN AUTO: 30.3 PG (ref 26–34)
MCHC RBC AUTO-ENTMCNC: 32.9 G/DL (ref 31–37)
MCV RBC AUTO: 92 FL
MONOCYTES # BLD AUTO: 0.6 X10(3) UL (ref 0.1–1)
MONOCYTES NFR BLD AUTO: 11.1 %
NEUTROPHILS # BLD AUTO: 3.43 X10 (3) UL (ref 1.5–7.7)
NEUTROPHILS # BLD AUTO: 3.43 X10(3) UL (ref 1.5–7.7)
NEUTROPHILS NFR BLD AUTO: 63.5 %
OSMOLALITY SERPL CALC.SUM OF ELEC: 290 MOSM/KG (ref 275–295)
PLATELET # BLD AUTO: 186 10(3)UL (ref 150–450)
POTASSIUM SERPL-SCNC: 4.2 MMOL/L (ref 3.5–5.1)
RBC # BLD AUTO: 4.76 X10(6)UL
SODIUM SERPL-SCNC: 139 MMOL/L (ref 136–145)
WBC # BLD AUTO: 5.4 X10(3) UL (ref 4–11)

## 2022-04-04 PROCEDURE — 93306 TTE W/DOPPLER COMPLETE: CPT | Performed by: INTERNAL MEDICINE

## 2022-04-04 PROCEDURE — 80048 BASIC METABOLIC PNL TOTAL CA: CPT | Performed by: INTERNAL MEDICINE

## 2022-04-04 PROCEDURE — 85025 COMPLETE CBC W/AUTO DIFF WBC: CPT | Performed by: INTERNAL MEDICINE

## 2022-04-04 RX ORDER — AMIODARONE HYDROCHLORIDE 200 MG/1
200 TABLET ORAL 2 TIMES DAILY
Qty: 18 TABLET | Refills: 0 | Status: SHIPPED | OUTPATIENT
Start: 2022-04-04 | End: 2022-04-04

## 2022-04-04 RX ORDER — METOPROLOL TARTRATE 50 MG/1
25 TABLET, FILM COATED ORAL 2 TIMES DAILY
Qty: 180 TABLET | Refills: 3 | Status: SHIPPED | OUTPATIENT
Start: 2022-04-04 | End: 2023-03-30

## 2022-04-04 NOTE — PLAN OF CARE
Patient's BP maintained in 90s/60s. HR in 50s to 60s. Metoprolol held. Patient slept throughout the night, with wife present at bedside during the night. No complaints. HR stable, no issues with ICD throughout the night. Plan for echo today. Problem: Patient Centered Care  Goal: Patient preferences are identified and integrated in the patient's plan of care  Description: Interventions:  - What would you like us to know as we care for you? I live at home with my wife  - Provide timely, complete, and accurate information to patient/family  - Incorporate patient and family knowledge, values, beliefs, and cultural backgrounds into the planning and delivery of care  - Encourage patient/family to participate in care and decision-making at the level they choose  - Honor patient and family perspectives and choices  Outcome: Progressing     Problem: Patient/Family Goals  Goal: Patient/Family Long Term Goal  Description: Patient's Long Term Goal: To not be shocked by my ICD again    Interventions:  - Figure out what happened  - See additional Care Plan goals for specific interventions  Outcome: Progressing  Goal: Patient/Family Short Term Goal  Description: Patient's Short Term Goal: Figure out why the ICD fired    Interventions:   - Testing  - See additional Care Plan goals for specific interventions  Outcome: Progressing     Problem: CARDIOVASCULAR - ADULT  Goal: Maintains optimal cardiac output and hemodynamic stability  Description: INTERVENTIONS:  - Monitor vital signs, rhythm, and trends  - Monitor for bleeding, hypotension and signs of decreased cardiac output  - Evaluate effectiveness of vasoactive medications to optimize hemodynamic stability  - Monitor arterial and/or venous puncture sites for bleeding and/or hematoma  - Assess quality of pulses, skin color and temperature  - Assess for signs of decreased coronary artery perfusion - ex.  Angina  - Evaluate fluid balance, assess for edema, trend weights  Outcome: Progressing  Goal: Absence of cardiac arrhythmias or at baseline  Description: INTERVENTIONS:  - Continuous cardiac monitoring, monitor vital signs, obtain 12 lead EKG if indicated  - Evaluate effectiveness of antiarrhythmic and heart rate control medications as ordered  - Initiate emergency measures for life threatening arrhythmias  - Monitor electrolytes and administer replacement therapy as ordered  Outcome: Progressing     Problem: PAIN - ADULT  Goal: Verbalizes/displays adequate comfort level or patient's stated pain goal  Description: INTERVENTIONS:  - Encourage pt to monitor pain and request assistance  - Assess pain using appropriate pain scale  - Administer analgesics based on type and severity of pain and evaluate response  - Implement non-pharmacological measures as appropriate and evaluate response  - Consider cultural and social influences on pain and pain management  - Manage/alleviate anxiety  - Utilize distraction and/or relaxation techniques  - Monitor for opioid side effects  - Notify MD/LIP if interventions unsuccessful or patient reports new pain  - Anticipate increased pain with activity and pre-medicate as appropriate  Outcome: Progressing     Problem: RISK FOR INFECTION - ADULT  Goal: Absence of fever/infection during anticipated neutropenic period  Description: INTERVENTIONS  - Monitor WBC  - Administer growth factors as ordered  - Implement neutropenic guidelines  Outcome: Progressing     Problem: SAFETY ADULT - FALL  Goal: Free from fall injury  Description: INTERVENTIONS:  - Assess pt frequently for physical needs  - Identify cognitive and physical deficits and behaviors that affect risk of falls.   - [place] fall precautions as indicated by assessment.  - Educate pt/family on patient safety including physical limitations  - Instruct pt to call for assistance with activity based on assessment  - Modify environment to reduce risk of injury  - Provide assistive devices as appropriate  - Consider OT/PT consult to assist with strengthening/mobility  - Encourage toileting schedule  Outcome: Progressing     Problem: DISCHARGE PLANNING  Goal: Discharge to home or other facility with appropriate resources  Description: INTERVENTIONS:  - Identify barriers to discharge w/pt and caregiver  - Include patient/family/discharge partner in discharge planning  - Arrange for needed discharge resources and transportation as appropriate  - Identify discharge learning needs (meds, wound care, etc)  - Arrange for interpreters to assist at discharge as needed  - Consider post-discharge preferences of patient/family/discharge partner  - Complete POLST form as appropriate  - Assess patient's ability to be responsible for managing their own health  - Refer to Case Management Department for coordinating discharge planning if the patient needs post-hospital services based on physician/LIP order or complex needs related to functional status, cognitive ability or social support system  Outcome: Progressing

## 2022-04-04 NOTE — PLAN OF CARE
1230 arrived from er to 339, settled in room & oriented to room bed & unit routine, see orders & notes, plan check of icd & pacer, obtained medtronic record & placed on chart, wife here, cont. To monitor & maintain comfort & safety.

## 2022-04-04 NOTE — PLAN OF CARE
Marcus Boss is alert and oriented, ambulatory and independent. Room air. 2D echo today with EF 20-25%. Plan is disposition pending cardiology recommendations. Problem: Patient Centered Care  Goal: Patient preferences are identified and integrated in the patient's plan of care  Description: Interventions:  - What would you like us to know as we care for you? I live at home with my wife  - Provide timely, complete, and accurate information to patient/family  - Incorporate patient and family knowledge, values, beliefs, and cultural backgrounds into the planning and delivery of care  - Encourage patient/family to participate in care and decision-making at the level they choose  - Honor patient and family perspectives and choices  Outcome: Progressing     Problem: Patient/Family Goals  Goal: Patient/Family Long Term Goal  Description: Patient's Long Term Goal: To not be shocked by my ICD again    Interventions:  - Figure out what happened  - See additional Care Plan goals for specific interventions  Outcome: Progressing  Goal: Patient/Family Short Term Goal  Description: Patient's Short Term Goal: Figure out why the ICD fired    Interventions:   - Testing  - See additional Care Plan goals for specific interventions  Outcome: Progressing     Problem: CARDIOVASCULAR - ADULT  Goal: Maintains optimal cardiac output and hemodynamic stability  Description: INTERVENTIONS:  - Monitor vital signs, rhythm, and trends  - Monitor for bleeding, hypotension and signs of decreased cardiac output  - Evaluate effectiveness of vasoactive medications to optimize hemodynamic stability  - Monitor arterial and/or venous puncture sites for bleeding and/or hematoma  - Assess quality of pulses, skin color and temperature  - Assess for signs of decreased coronary artery perfusion - ex.  Angina  - Evaluate fluid balance, assess for edema, trend weights  Outcome: Progressing  Goal: Absence of cardiac arrhythmias or at baseline  Description: INTERVENTIONS:  - Continuous cardiac monitoring, monitor vital signs, obtain 12 lead EKG if indicated  - Evaluate effectiveness of antiarrhythmic and heart rate control medications as ordered  - Initiate emergency measures for life threatening arrhythmias  - Monitor electrolytes and administer replacement therapy as ordered  Outcome: Progressing     Problem: PAIN - ADULT  Goal: Verbalizes/displays adequate comfort level or patient's stated pain goal  Description: INTERVENTIONS:  - Encourage pt to monitor pain and request assistance  - Assess pain using appropriate pain scale  - Administer analgesics based on type and severity of pain and evaluate response  - Implement non-pharmacological measures as appropriate and evaluate response  - Consider cultural and social influences on pain and pain management  - Manage/alleviate anxiety  - Utilize distraction and/or relaxation techniques  - Monitor for opioid side effects  - Notify MD/LIP if interventions unsuccessful or patient reports new pain  - Anticipate increased pain with activity and pre-medicate as appropriate  Outcome: Progressing     Problem: RISK FOR INFECTION - ADULT  Goal: Absence of fever/infection during anticipated neutropenic period  Description: INTERVENTIONS  - Monitor WBC  - Administer growth factors as ordered  - Implement neutropenic guidelines  Outcome: Progressing     Problem: SAFETY ADULT - FALL  Goal: Free from fall injury  Description: INTERVENTIONS:  - Assess pt frequently for physical needs  - Identify cognitive and physical deficits and behaviors that affect risk of falls.   - [place] fall precautions as indicated by assessment.  - Educate pt/family on patient safety including physical limitations  - Instruct pt to call for assistance with activity based on assessment  - Modify environment to reduce risk of injury  - Provide assistive devices as appropriate  - Consider OT/PT consult to assist with strengthening/mobility  - Encourage toileting schedule  Outcome: Progressing     Problem: DISCHARGE PLANNING  Goal: Discharge to home or other facility with appropriate resources  Description: INTERVENTIONS:  - Identify barriers to discharge w/pt and caregiver  - Include patient/family/discharge partner in discharge planning  - Arrange for needed discharge resources and transportation as appropriate  - Identify discharge learning needs (meds, wound care, etc)  - Arrange for interpreters to assist at discharge as needed  - Consider post-discharge preferences of patient/family/discharge partner  - Complete POLST form as appropriate  - Assess patient's ability to be responsible for managing their own health  - Refer to Case Management Department for coordinating discharge planning if the patient needs post-hospital services based on physician/LIP order or complex needs related to functional status, cognitive ability or social support system  Outcome: Progressing

## 2022-04-06 ENCOUNTER — PATIENT OUTREACH (OUTPATIENT)
Dept: CASE MANAGEMENT | Age: 72
End: 2022-04-06

## 2022-04-06 ENCOUNTER — TELEPHONE (OUTPATIENT)
Dept: INTERNAL MEDICINE CLINIC | Facility: CLINIC | Age: 72
End: 2022-04-06

## 2022-04-06 PROCEDURE — 1111F DSCHRG MED/CURRENT MED MERGE: CPT

## 2022-04-06 NOTE — TELEPHONE ENCOUNTER
Spoke to pt for TCM today. Pt does not have HFU appt scheduled at this time. TCM/HFU appt recommended by 4/11/2022 as pt is at High risk for readmission per lace score. NCM attempted to schedule a [facility] HFU, patient declined the appointment stating that he does not feel that it is necessary to see Dr. Gill Malik right now as there is no real illness, just has to do with his heart. Message sent to MD's office. BOOK BY DATE (last date for TCM): 4/18/2022    TRIAGE:  Please notify Dr. Gill Malik of the above and if he would like to see the patient for a TCM HFU please call the patient and try to get him to schedule a TCM HFU within the TCM timeframe. Thank you!

## 2022-04-18 ENCOUNTER — OFFICE VISIT (OUTPATIENT)
Dept: AUDIOLOGY | Facility: CLINIC | Age: 72
End: 2022-04-18
Payer: MEDICARE

## 2022-04-18 DIAGNOSIS — H90.3 SENSORINEURAL HEARING LOSS, BILATERAL: Primary | ICD-10-CM

## 2022-04-18 PROCEDURE — 1111F DSCHRG MED/CURRENT MED MERGE: CPT | Performed by: AUDIOLOGIST

## 2022-04-18 PROCEDURE — 92557 COMPREHENSIVE HEARING TEST: CPT | Performed by: AUDIOLOGIST

## 2022-05-04 ENCOUNTER — PATIENT MESSAGE (OUTPATIENT)
Dept: AUDIOLOGY | Facility: CLINIC | Age: 72
End: 2022-05-04

## 2022-05-04 ENCOUNTER — PATIENT MESSAGE (OUTPATIENT)
Dept: INTERNAL MEDICINE CLINIC | Facility: CLINIC | Age: 72
End: 2022-05-04

## 2022-05-04 ENCOUNTER — TELEPHONE (OUTPATIENT)
Dept: INTERNAL MEDICINE CLINIC | Facility: CLINIC | Age: 72
End: 2022-05-04

## 2022-05-04 NOTE — TELEPHONE ENCOUNTER
From: Hamilton Dick  To: Flores Zambrano MD  Sent: 5/4/2022 8:26 AM CDT  Subject: Covid    This is Onesimo Ledezma and a PCR test I took at [facility] came back Positive. It started Saturday 4/30 with a runny Nose, 5/1 felt poorly and still had sinus issues and a cough started. That night I got the Chills and a cough, 5/1 went for Covid test and still felt poorly. Cough continues 5/3 and 5/4. Chills again on Monday night. No fever no body aches no loss of taste or smell. Breathing seems ok Blood oxygen level 94-96%. Feel a little better today treating with extra strength Tylenol and Cough medicine.  Do you need to prescribe any thing else???  Fully vaccinated and Everton Salas

## 2022-05-04 NOTE — TELEPHONE ENCOUNTER
From: King Adalberto  Sent: 5/4/2022 9:21 AM CDT  To: Em Ent Clinical Staff  Subject: Hearing Aid Benefits    My Insurance says it pays up to $750 per ear but what does this mean? can I go to you and you get the hearing aids through them?  What is my next step    [facility]

## 2022-05-04 NOTE — TELEPHONE ENCOUNTER
The medications used to treat COVID should be initiated within the first 5 days. Patient's symptoms started April 30 and he is already fully vaccinated; therefore, this would be too late to treat especially since patient already is feeling better   Patient may continue with Tylenol as needed. Continue with over-the-counter cough medicine as needed. Continue to push fluids and get more rest.  Continue to monitor oxygenation with pulse oximetry. Certainly notify us if there should be any symptom worsening.

## 2022-05-12 ENCOUNTER — PATIENT MESSAGE (OUTPATIENT)
Dept: INTERNAL MEDICINE CLINIC | Facility: CLINIC | Age: 72
End: 2022-05-12

## 2022-05-12 NOTE — TELEPHONE ENCOUNTER
Routed to Dr. Zara Corbin to advise. Last prescribed by Dr. Kendra Gallegos in 2013. Will you take over refills for Mr. Dick's Ipratropium- Albuterol Inhalers? Pended RX below.

## 2022-05-12 NOTE — TELEPHONE ENCOUNTER
From: Jarad Dick  To: Mavis Garcia MD  Sent: 2022 8:18 AM CDT  Subject: Refill of my inhaler which     Could you send a refill of my inhaler that ? It is Albutrol sulfate inhalation aerosol.   Please send to the [facility] on [address] [facility] in [address] [place] [address]

## 2022-05-13 ENCOUNTER — PATIENT MESSAGE (OUTPATIENT)
Dept: INTERNAL MEDICINE CLINIC | Facility: CLINIC | Age: 72
End: 2022-05-13

## 2022-05-13 NOTE — TELEPHONE ENCOUNTER
Combivent is the better choice as this combines both albuterol and another agent to help with COPD/asthma. There would be no problem with interactions with his other medications. If he is unable to fill the Combivent, then would be okay to use the albuterol sulfate that he was on previously; however Combivent is the better choice.

## 2022-07-26 ENCOUNTER — TELEPHONE (OUTPATIENT)
Dept: INTERNAL MEDICINE CLINIC | Facility: CLINIC | Age: 72
End: 2022-07-26

## 2022-07-26 RX ORDER — TADALAFIL 10 MG/1
10 TABLET ORAL
Qty: 30 TABLET | Refills: 3 | Status: SHIPPED | OUTPATIENT
Start: 2022-07-26

## 2022-07-26 NOTE — TELEPHONE ENCOUNTER
Please advise - called patient who had RX a couple years back - wants it printed and mailed to him . He will use pharmacy in [facility] - to DR. GERMAN

## 2022-08-22 ENCOUNTER — OFFICE VISIT (OUTPATIENT)
Dept: DERMATOLOGY CLINIC | Facility: CLINIC | Age: 72
End: 2022-08-22
Payer: MEDICARE

## 2022-08-22 DIAGNOSIS — D48.5 NEOPLASM OF UNCERTAIN BEHAVIOR OF SKIN: Primary | ICD-10-CM

## 2022-08-22 DIAGNOSIS — D23.9 BENIGN NEOPLASM OF SKIN, UNSPECIFIED LOCATION: ICD-10-CM

## 2022-08-22 DIAGNOSIS — D22.9 MULTIPLE NEVI: ICD-10-CM

## 2022-08-22 DIAGNOSIS — D23.9 DERMATOFIBROMA: ICD-10-CM

## 2022-08-22 DIAGNOSIS — L82.1 SK (SEBORRHEIC KERATOSIS): ICD-10-CM

## 2022-08-22 PROCEDURE — 88305 TISSUE EXAM BY PATHOLOGIST: CPT | Performed by: DERMATOLOGY

## 2022-08-25 NOTE — PROGRESS NOTES
The pathology report from last visit showed  right mid back  Compound nevus . Please log in test results, send biopsy results letter. Pt to rtc 1 year or prn.

## 2022-09-05 NOTE — PROGRESS NOTES
Operative Report                     Shave/  Tangential biopsy     Clinical diagnosis:    Size of lesion:  pt with many nevi irregularly pigmented lesion on exam  Spec 1 Description >>>>>: right mid back  Spec 1 Comment: r/o atypical nevus tan papule with irregular light brown base with hazy border on dermoscopy  Location:    Procedure: With patient in appropriate position the skin of the above was scrubbed with alcohol. Anesthesia was obtained with 1% Xylocaine with epinephrine. The skin surrounding the lesion was placed under tension and the lesion was incised using a #15 scalpel blade. The specimen was sent for histopathologic exam.    Hemostasis was obtained with electrocautery/aluminum chloride. Estimated blood loss less than 2 cc. Biopsy dressed with Polysporin, bandage. Pressure dressing:   No    Complications: None    Written instructions given and reviewed with patient    Await pathology    Contact information reviewed.     Procedural physician:  Jose Chaparro MD

## 2022-09-23 DIAGNOSIS — R30.0 DYSURIA: Primary | ICD-10-CM

## 2022-09-23 RX ORDER — SULFAMETHOXAZOLE AND TRIMETHOPRIM 800; 160 MG/1; MG/1
1 TABLET ORAL 2 TIMES DAILY
Qty: 10 TABLET | Refills: 0 | Status: SHIPPED | OUTPATIENT
Start: 2022-09-23

## 2022-11-23 ENCOUNTER — LAB ENCOUNTER (OUTPATIENT)
Dept: LAB | Age: 72
End: 2022-11-23
Attending: INTERNAL MEDICINE
Payer: MEDICARE

## 2022-11-23 ENCOUNTER — OFFICE VISIT (OUTPATIENT)
Dept: INTERNAL MEDICINE CLINIC | Facility: CLINIC | Age: 72
End: 2022-11-23
Payer: MEDICARE

## 2022-11-23 VITALS
SYSTOLIC BLOOD PRESSURE: 106 MMHG | OXYGEN SATURATION: 96 % | BODY MASS INDEX: 38.8 KG/M2 | DIASTOLIC BLOOD PRESSURE: 80 MMHG | WEIGHT: 277.13 LBS | HEIGHT: 71 IN | HEART RATE: 85 BPM

## 2022-11-23 DIAGNOSIS — I42.0 DILATED CARDIOMYOPATHY (HCC): ICD-10-CM

## 2022-11-23 DIAGNOSIS — Z00.00 PHYSICAL EXAM: Primary | ICD-10-CM

## 2022-11-23 DIAGNOSIS — G62.9 NEUROPATHY: ICD-10-CM

## 2022-11-23 DIAGNOSIS — E78.5 HYPERLIPIDEMIA, UNSPECIFIED HYPERLIPIDEMIA TYPE: ICD-10-CM

## 2022-11-23 DIAGNOSIS — E66.01 MORBID OBESITY (HCC): ICD-10-CM

## 2022-11-23 DIAGNOSIS — Z95.2 H/O AORTIC VALVE REPLACEMENT: ICD-10-CM

## 2022-11-23 DIAGNOSIS — Z95.810 AICD (AUTOMATIC CARDIOVERTER/DEFIBRILLATOR) PRESENT: ICD-10-CM

## 2022-11-23 DIAGNOSIS — J30.2 CHRONIC SEASONAL ALLERGIC RHINITIS: ICD-10-CM

## 2022-11-23 DIAGNOSIS — D12.6 COLON ADENOMA: ICD-10-CM

## 2022-11-23 DIAGNOSIS — I49.01 VENTRICULAR FIBRILLATION (HCC): ICD-10-CM

## 2022-11-23 DIAGNOSIS — Z00.00 PHYSICAL EXAM: ICD-10-CM

## 2022-11-23 DIAGNOSIS — I50.22 CHRONIC SYSTOLIC CONGESTIVE HEART FAILURE (HCC): ICD-10-CM

## 2022-11-23 LAB
ALBUMIN SERPL-MCNC: 4.1 G/DL (ref 3.4–5)
ALBUMIN/GLOB SERPL: 1.1 {RATIO} (ref 1–2)
ALP LIVER SERPL-CCNC: 74 U/L
ALT SERPL-CCNC: 33 U/L
ANION GAP SERPL CALC-SCNC: 6 MMOL/L (ref 0–18)
AST SERPL-CCNC: 23 U/L (ref 15–37)
BASOPHILS # BLD AUTO: 0.05 X10(3) UL (ref 0–0.2)
BASOPHILS NFR BLD AUTO: 0.7 %
BILIRUB SERPL-MCNC: 0.7 MG/DL (ref 0.1–2)
BUN BLD-MCNC: 18 MG/DL (ref 7–18)
BUN/CREAT SERPL: 13.3 (ref 10–20)
CALCIUM BLD-MCNC: 9.9 MG/DL (ref 8.5–10.1)
CHLORIDE SERPL-SCNC: 106 MMOL/L (ref 98–112)
CHOLEST SERPL-MCNC: 156 MG/DL (ref ?–200)
CO2 SERPL-SCNC: 27 MMOL/L (ref 21–32)
COMPLEXED PSA SERPL-MCNC: 2.82 NG/ML (ref ?–4)
CREAT BLD-MCNC: 1.35 MG/DL
DEPRECATED RDW RBC AUTO: 45.7 FL (ref 35.1–46.3)
EOSINOPHIL # BLD AUTO: 0.24 X10(3) UL (ref 0–0.7)
EOSINOPHIL NFR BLD AUTO: 3.6 %
ERYTHROCYTE [DISTWIDTH] IN BLOOD BY AUTOMATED COUNT: 13.3 % (ref 11–15)
EST. AVERAGE GLUCOSE BLD GHB EST-MCNC: 108 MG/DL (ref 68–126)
FASTING PATIENT LIPID ANSWER: NO
FASTING STATUS PATIENT QL REPORTED: NO
GFR SERPLBLD BASED ON 1.73 SQ M-ARVRAT: 55 ML/MIN/1.73M2 (ref 60–?)
GLOBULIN PLAS-MCNC: 3.7 G/DL (ref 2.8–4.4)
GLUCOSE BLD-MCNC: 92 MG/DL (ref 70–99)
HBA1C MFR BLD: 5.4 % (ref ?–5.7)
HCT VFR BLD AUTO: 43.5 %
HDLC SERPL-MCNC: 47 MG/DL (ref 40–59)
HGB BLD-MCNC: 14.3 G/DL
IMM GRANULOCYTES # BLD AUTO: 0.01 X10(3) UL (ref 0–1)
IMM GRANULOCYTES NFR BLD: 0.1 %
LDLC SERPL CALC-MCNC: 82 MG/DL (ref ?–100)
LYMPHOCYTES # BLD AUTO: 1.6 X10(3) UL (ref 1–4)
LYMPHOCYTES NFR BLD AUTO: 23.7 %
MCH RBC QN AUTO: 30.3 PG (ref 26–34)
MCHC RBC AUTO-ENTMCNC: 32.9 G/DL (ref 31–37)
MCV RBC AUTO: 92.2 FL
MONOCYTES # BLD AUTO: 0.5 X10(3) UL (ref 0.1–1)
MONOCYTES NFR BLD AUTO: 7.4 %
NEUTROPHILS # BLD AUTO: 4.35 X10 (3) UL (ref 1.5–7.7)
NEUTROPHILS # BLD AUTO: 4.35 X10(3) UL (ref 1.5–7.7)
NEUTROPHILS NFR BLD AUTO: 64.5 %
NONHDLC SERPL-MCNC: 109 MG/DL (ref ?–130)
OSMOLALITY SERPL CALC.SUM OF ELEC: 290 MOSM/KG (ref 275–295)
PLATELET # BLD AUTO: 201 10(3)UL (ref 150–450)
POTASSIUM SERPL-SCNC: 4.7 MMOL/L (ref 3.5–5.1)
PROT SERPL-MCNC: 7.8 G/DL (ref 6.4–8.2)
RBC # BLD AUTO: 4.72 X10(6)UL
SODIUM SERPL-SCNC: 139 MMOL/L (ref 136–145)
TRIGL SERPL-MCNC: 154 MG/DL (ref 30–149)
VLDLC SERPL CALC-MCNC: 24 MG/DL (ref 0–30)
WBC # BLD AUTO: 6.8 X10(3) UL (ref 4–11)

## 2022-11-23 PROCEDURE — 80061 LIPID PANEL: CPT

## 2022-11-23 PROCEDURE — 83036 HEMOGLOBIN GLYCOSYLATED A1C: CPT

## 2022-11-23 PROCEDURE — 80053 COMPREHEN METABOLIC PANEL: CPT

## 2022-11-23 PROCEDURE — 85025 COMPLETE CBC W/AUTO DIFF WBC: CPT

## 2022-11-23 PROCEDURE — 36415 COLL VENOUS BLD VENIPUNCTURE: CPT

## 2024-07-17 ENCOUNTER — TELEPHONE (OUTPATIENT)
Dept: INTERNAL MEDICINE CLINIC | Facility: CLINIC | Age: 74
End: 2024-07-17

## 2024-09-12 RX ORDER — EMPAGLIFLOZIN 10 MG/1
10 TABLET, FILM COATED ORAL DAILY
COMMUNITY
Start: 2024-07-02

## 2024-09-16 ENCOUNTER — HOSPITAL ENCOUNTER (OUTPATIENT)
Dept: INTERVENTIONAL RADIOLOGY/VASCULAR | Facility: HOSPITAL | Age: 74
Discharge: HOME | End: 2024-09-16
Attending: NURSE PRACTITIONER | Admitting: INTERNAL MEDICINE
Payer: MEDICARE

## 2024-09-16 VITALS
WEIGHT: 274 LBS | OXYGEN SATURATION: 95 % | RESPIRATION RATE: 20 BRPM | SYSTOLIC BLOOD PRESSURE: 97 MMHG | BODY MASS INDEX: 38.36 KG/M2 | HEIGHT: 71 IN | DIASTOLIC BLOOD PRESSURE: 70 MMHG | TEMPERATURE: 98 F | HEART RATE: 68 BPM

## 2024-09-16 DIAGNOSIS — I48.19 PERSISTENT ATRIAL FIBRILLATION (HCC): ICD-10-CM

## 2024-09-16 PROCEDURE — 92960 CARDIOVERSION ELECTRIC EXT: CPT | Performed by: INTERNAL MEDICINE

## 2024-09-16 PROCEDURE — 36415 COLL VENOUS BLD VENIPUNCTURE: CPT

## 2024-09-16 PROCEDURE — 5A2204Z RESTORATION OF CARDIAC RHYTHM, SINGLE: ICD-10-PCS | Performed by: INTERNAL MEDICINE

## 2024-09-16 RX ORDER — METHOHEXITAL IN WATER/PF 100MG/10ML
SYRINGE (ML) INTRAVENOUS
Status: COMPLETED
Start: 2024-09-16 | End: 2024-09-16

## 2024-09-16 RX ORDER — METHOHEXITAL IN WATER/PF 100MG/10ML
50 SYRINGE (ML) INTRAVENOUS ONCE
Status: COMPLETED | OUTPATIENT
Start: 2024-09-16 | End: 2024-09-16

## 2024-09-16 RX ORDER — SODIUM CHLORIDE 9 MG/ML
INJECTION, SOLUTION INTRAVENOUS CONTINUOUS
Status: DISCONTINUED | OUTPATIENT
Start: 2024-09-16 | End: 2024-09-16

## 2024-09-16 NOTE — PROCEDURES
[facility]    PATIENT'S NAME: NORA NATHAN   ATTENDING PHYSICIAN: Jimmie Paz MD   OPERATING PHYSICIAN: Jimmie Paz MD   PATIENT ACCOUNT#:   [id number]    LOCATION:  [place] [address]  MEDICAL RECORD #:   [id number]       YOB: 1950  ADMISSION DATE:       09/16/2024      OPERATION DATE:  09/16/2024    CARDIAC PROCEDURE TRANSCRIPTION      ELECTIVE CARDIOVERSION  PREOPERATIVE DIAGNOSIS:    POSTOPERATIVE DIAGNOSIS:    PROCEDURE PERFORMED:      HISTORY:  This is a 74-year-old gentleman with a Medtronic ICD bi-V that went into atrial fibrillation last month.  He has been on Eliquis for greater than 1 month without any missed doses.  He is here for elective cardioversion.    DESCRIPTION OF PROCEDURE:  Patient brought to the EP laboratory in a fasting state.  He was assessed ASA 2, Mallampati 2.  Blood pressure, heart rate, telemetry, O2, CO2 would all be monitored.  He would be observed by myself and the nursing staff.  Under my direct supervision, he would be administered 50 mg of Brevital in divided doses.  He would undergo 1 synchronized biphasic cardioversion of 200 joules to normal sinus rhythm.  He would tolerate the procedure well.  The procedure would be finished at 1321.  His Medtronic defibrillator was interrogated before and after.  There were no changes in programmed parameters.    IMPRESSION:  Successful cardioversion of atypical atrial flutter.    PLAN:  Patient will continue current medical therapy and routine followup.     Dictated By Jimmie Paz MD  d: 09/16/2024 13:11:50  t: 09/16/2024 13:20:57  [facility] [id number]  MON/

## 2024-09-16 NOTE — IVS NOTE
DISCHARGE NOTE     Pt is able to sit up and ambulate without difficulty.   Pt voided.  No redness or soreness at the pad removal site.   IV access removed  Instruction provided, patient/family verbalizes understanding.   Dr. Paz spoke with patient/family post procedure.     Pt discharge via wheelchair to [facility]       Follow up Appointment: in dec. As directed     New Prescription: none

## 2025-06-20 DIAGNOSIS — S86.011A ACHILLES RUPTURE, RIGHT: Primary | ICD-10-CM

## (undated) DIAGNOSIS — I47.2 VENTRICULAR TACHYCARDIA (HCC): ICD-10-CM

## (undated) DIAGNOSIS — R55 SYNCOPE AND COLLAPSE: ICD-10-CM

## (undated) DIAGNOSIS — Z95.810 AICD (AUTOMATIC CARDIOVERTER/DEFIBRILLATOR) PRESENT: ICD-10-CM

## (undated) DIAGNOSIS — I42.0 DILATED CARDIOMYOPATHY (HCC): ICD-10-CM

## (undated) DIAGNOSIS — Z02.9 ENCOUNTERS FOR UNSPECIFIED ADMINISTRATIVE PURPOSE: ICD-10-CM

## (undated) DIAGNOSIS — Z45.02 DEFIBRILLATOR DISCHARGE: ICD-10-CM

## (undated) DIAGNOSIS — I49.01 VENTRICULAR FIBRILLATION (HCC): ICD-10-CM

## (undated) DIAGNOSIS — Z95.2 H/O AORTIC VALVE REPLACEMENT: ICD-10-CM

## (undated) DIAGNOSIS — Z01.10 ENCOUNTER FOR HEARING EXAMINATION, UNSPECIFIED WHETHER ABNORMAL FINDINGS: Primary | ICD-10-CM

## (undated) DEVICE — SNARE OPTMZ PLPCTM TRP

## (undated) DEVICE — KIT ENDO ORCAPOD 160/180/190

## (undated) DEVICE — 35 ML SYRINGE REGULAR TIP: Brand: MONOJECT

## (undated) DEVICE — FORCEP RADIAL JAW 4

## (undated) DEVICE — SNARE ENDOSCOPIC 10MM ROUND

## (undated) DEVICE — LINE MNTR ADLT SET O2 INTMD

## (undated) DEVICE — KIT CLEAN ENDOKIT 1.1OZ GOWNX2

## (undated) DEVICE — MEDI-VAC NON-CONDUCTIVE SUCTION TUBING 6MM X 1.8M (6FT.) L: Brand: CARDINAL HEALTH

## (undated) NOTE — Clinical Note
FYI, TCM call made, see notes. NCM attempted to schedule a [facility] HFU, patient declined the appointment stating that he does not feel that it is necessary to see Dr. Flory Couch right now as there is no real illness, just has to do with his heart. Message sent to MD's office.

## (undated) NOTE — IP AVS SNAPSHOT
Patient Demographics     Address  [address] [facility] [address] [facility] [address] Phone  [phone] [facility]) *Preferred*  [phone] [facility]) E-mail Address  [email or web address]      Emergency Contact(s)     Name Relation Home Work Mobile    Brina Dick Spouse Commonly known as: Entresto      Take 1 tablet by mouth 2 (two) times daily. Barbra Coker, RUPA         Sotalol HCl 120 MG Tabs  Commonly known as: BETAPACE      Take 1 tablet (120 mg total) by mouth 2 (two) times daily.    Jyoti Appiah MD         spir Patient Weight  129.7 kg (285 lb 14.4 oz)      Lab Results Last 24 Hours    No matching results found     Microbiology Results (All)     Procedure Component Value Units Date/Time    SARS-CoV-2 by PCR () Once [[id number]]  (Normal) Collected: 12/16/20 1 Patient did not sleep well last night, but did not have any defibrillator discharges and monitor has shown paced rhythm.     History     Past Medical History:   Diagnosis Date   • Aortic regurgitation    • Arrhythmia    • Arthritis     per ng   • Congestive •  Sotalol HCl 120 MG Oral Tab, Take 1 tablet (120 mg total) by mouth 2 (two) times daily. •  carvedilol 25 MG Oral Tab, TAKE 1 TABLET TWICE DAILY  WITH MEALS    •  furosemide 20 MG Oral Tab, Take 1 tablet (20 mg total) by mouth daily as needed.     •  a Edema not present. Pulmonary/Chest: Effort normal and breath sounds normal. No respiratory distress. He has no wheezes. He has no rales. Abdominal: Soft. He exhibits no distension and no mass. There is no abdominal tenderness.    Lymphadenopathy:     He AICD (automatic cardioverter/defibrillator) present-Medtronic    Defibrillator discharge    Ventricular tachycardia [facility])    Patient with syncope secondary to recurrent V. tach requiring defibrillator discharge.   Cardiac cath without any significant obst HISTORY OF PRESENT ILLNESS:  This is a 23-year-old gentleman we are asked to see for recurrent ventricular tachycardia. The patient has a history of dilated cardiomyopathy and left bundle branch block.   In December 2014, he had a tissue aortic valve repla REVIEW OF SYSTEMS:  Obtained and was negative, including no history of thyroid disease. PHYSICAL EXAMINATION:    GENERAL:  He is a well-appearing gentleman, heavyset, in no distress. VITAL SIGNS:  Blood pressure 133/72, pulse 66.   HEENT:  Head is n 3.   Continue telemetry. 4.   I have reviewed the interrogations that devise normal function. The patient is also having atrial flutter. We will continue Eliquis for stroke prevention. 5.   Reviewed ablation of the ventricular tachycardia.   We will for Patient is a 69year old male admitted 12/15/2020 for syncope and collapse, defrillator went off 3 times. Patient received ambulating in hallway with PCT, agreeable to physical therapy. SANDOR Sparks approved participation in physical therapy.  PT wore mask a • High blood pressure    • High cholesterol    • Obesity, unspecified    • Osteoarthritis    • Other and unspecified hyperlipidemia    • Seasonal allergies    • Unspecified essential hypertension        Past Surgical History  Past Surgical History:   Proce -   Moving to and from a bed to a chair (including a wheelchair)?: None   -   Need to walk in hospital room?: None   -   Climbing 3-5 steps with a railing?: A Little     AM-PAC Score:  Raw Score: 23   Approx Degree of Impairment: 11.2%   Standardized Score • Seasonal allergies    • Unspecified essential hypertension        Past Surgical History  Past Surgical History:   Procedure Laterality Date   • CARDIAC DEFIBRILLATOR PLACEMENT     • COLONOSCOPY      done 2015 -colon adenoma   • KNEE REPLACEMENT SURGERY L -   Climbing 3-5 steps with a railing?: A Little     AM-PAC Score:  Raw Score: 23   Approx Degree of Impairment: 11.2%   Standardized Score (AM-PAC Scale): 56.93   CMS Modifier (G-Code): CI    FUNCTIONAL ABILITY STATUS  Gait Assessment   Gait Assistance: M Interventions:  - Left heart cath  - cardiac monitoring   - IVF for hypotensive episodes   - See additional Care Plan goals for specific interventions   Patient/Family Short Term Goal     Interdisciplinary Progressing    Description: Patient's Short Term G

## (undated) NOTE — LETTER
10/2/2020    Hao Baum        [id number] [address] [facility]            Dear Hao Baum,      Our records indicate that you are due for an appointment for a Colonoscopy with Zahraa Hoover MD.    Please call our office to schedule this

## (undated) NOTE — LETTER
[facility], [place]  Authorization for Invasive Procedures  1. I hereby authorize  Dr. Cornelio Lemus  , my physician and whomever may be designated as the doctor's assistant, to perform the following operation and/or procedure:  Insertion 5. I consent to the photographing of the operations or procedures to be performed for the purposes of advancing medicine, science, and/or education, provided my identity is not revealed.  If the procedure has been videotaped, the physician/surgeon will obta __________ Time: ___________    Statement of Physician  My signature below affirms that prior to the time of the procedure, I have explained to the patient and/or his legal representative, the risks and benefits involved in the proposed treatment and any r

## (undated) NOTE — LETTER
8/25/2022              Avni Nelson        [address] [facility] [address]         Dear Scott Garcia,      The report of the Biopsy done on 8/22/22 shows a Compound Nevus. This is a benign (not cancerous) growth, and requires no further treatment. If you have any questions please contact our office.                 Yours Truly,    Ochoa Johnson MD  [address] [place] [address] Road  [address] [place] [id number] [phone]